# Patient Record
Sex: MALE | Race: BLACK OR AFRICAN AMERICAN | Employment: UNEMPLOYED | URBAN - METROPOLITAN AREA
[De-identification: names, ages, dates, MRNs, and addresses within clinical notes are randomized per-mention and may not be internally consistent; named-entity substitution may affect disease eponyms.]

---

## 2017-02-05 ENCOUNTER — APPOINTMENT (OUTPATIENT)
Dept: GENERAL RADIOLOGY | Age: 27
End: 2017-02-05
Payer: MEDICARE

## 2017-02-05 ENCOUNTER — APPOINTMENT (OUTPATIENT)
Dept: CT IMAGING | Age: 27
End: 2017-02-05
Payer: MEDICARE

## 2017-02-05 ENCOUNTER — HOSPITAL ENCOUNTER (EMERGENCY)
Age: 27
Discharge: HOME | End: 2017-02-05
Attending: EMERGENCY MEDICINE
Payer: MEDICARE

## 2017-02-05 VITALS
RESPIRATION RATE: 18 BRPM | DIASTOLIC BLOOD PRESSURE: 90 MMHG | OXYGEN SATURATION: 99 % | TEMPERATURE: 97.3 F | SYSTOLIC BLOOD PRESSURE: 153 MMHG | HEART RATE: 89 BPM
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
## 2017-02-05 DIAGNOSIS — M79.605 LEG PAIN, LEFT: ICD-10-CM

## 2017-02-05 DIAGNOSIS — S09.90XA CLOSED HEAD INJURY, INITIAL ENCOUNTER: ICD-10-CM

## 2017-02-05 PROCEDURE — 6370000000 HC RX 637 (ALT 250 FOR IP): Performed by: EMERGENCY MEDICINE

## 2017-02-05 PROCEDURE — 99284 EMERGENCY DEPT VISIT MOD MDM: CPT

## 2017-02-05 PROCEDURE — 73590 X-RAY EXAM OF LOWER LEG: CPT

## 2017-02-05 PROCEDURE — 72125 CT NECK SPINE W/O DYE: CPT

## 2017-02-05 PROCEDURE — 70450 CT HEAD/BRAIN W/O DYE: CPT

## 2017-02-05 RX ORDER — CYCLOBENZAPRINE HCL 10 MG
10 TABLET ORAL ONCE
Status: COMPLETED | OUTPATIENT
Start: 2017-02-05 | End: 2017-02-05

## 2017-02-05 RX ORDER — CYCLOBENZAPRINE HCL 10 MG
10 TABLET ORAL 3 TIMES DAILY PRN
Qty: 30 TABLET | Refills: 0 | Status: SHIPPED | OUTPATIENT
Start: 2017-02-05 | End: 2017-02-15

## 2017-02-05 RX ORDER — ACETAMINOPHEN 500 MG
1000 TABLET ORAL ONCE
Status: COMPLETED | OUTPATIENT
Start: 2017-02-05 | End: 2017-02-05

## 2017-02-05 RX ORDER — CYCLOBENZAPRINE HCL 10 MG
TABLET ORAL
Status: DISCONTINUED
Start: 2017-02-05 | End: 2017-02-05 | Stop reason: HOSPADM

## 2017-02-05 RX ORDER — SENNOSIDES 8.6 MG
650 CAPSULE ORAL EVERY 8 HOURS PRN
Qty: 60 TABLET | Refills: 0 | Status: SHIPPED | OUTPATIENT
Start: 2017-02-05

## 2017-02-05 RX ADMIN — CYCLOBENZAPRINE HYDROCHLORIDE 10 MG: 10 TABLET, FILM COATED ORAL at 00:29

## 2017-02-05 RX ADMIN — ACETAMINOPHEN 1000 MG: 500 TABLET ORAL at 00:29

## 2017-02-05 ASSESSMENT — PAIN SCALES - GENERAL: PAINLEVEL_OUTOF10: 9

## 2017-02-05 ASSESSMENT — ENCOUNTER SYMPTOMS
ABDOMINAL PAIN: 0
BLURRED VISION: 0
BACK PAIN: 1
SHORTNESS OF BREATH: 0

## 2017-02-05 ASSESSMENT — PAIN DESCRIPTION - PAIN TYPE: TYPE: ACUTE PAIN

## 2017-05-18 ENCOUNTER — HOSPITAL ENCOUNTER (EMERGENCY)
Age: 27
Discharge: HOME | End: 2017-05-18
Attending: EMERGENCY MEDICINE
Payer: MEDICARE

## 2017-05-18 VITALS
DIASTOLIC BLOOD PRESSURE: 72 MMHG | RESPIRATION RATE: 18 BRPM | OXYGEN SATURATION: 97 % | SYSTOLIC BLOOD PRESSURE: 112 MMHG | WEIGHT: 147 LBS | BODY MASS INDEX: 19.91 KG/M2 | HEIGHT: 72 IN | TEMPERATURE: 98 F | HEART RATE: 78 BPM

## 2017-05-18 DIAGNOSIS — A64 STD (MALE): Primary | ICD-10-CM

## 2017-05-18 LAB
-: ABNORMAL
AMORPHOUS: ABNORMAL
BACTERIA: ABNORMAL
BILIRUBIN URINE: NEGATIVE
CASTS UA: ABNORMAL /LPF (ref 0–8)
COLOR: YELLOW
CRYSTALS, UA: ABNORMAL /HPF
EPITHELIAL CELLS UA: ABNORMAL /HPF (ref 0–5)
GLUCOSE URINE: NEGATIVE
KETONES, URINE: NEGATIVE
LEUKOCYTE ESTERASE, URINE: ABNORMAL
MUCUS: ABNORMAL
NITRITE, URINE: NEGATIVE
OTHER OBSERVATIONS UA: ABNORMAL
PH UA: >9 (ref 5–8)
PROTEIN UA: NEGATIVE
RBC UA: ABNORMAL /HPF (ref 0–4)
RENAL EPITHELIAL, UA: ABNORMAL /HPF
SPECIFIC GRAVITY UA: 1.02 (ref 1–1.03)
TRICHOMONAS: ABNORMAL
TURBIDITY: CLEAR
URINE HGB: NEGATIVE
UROBILINOGEN, URINE: NORMAL
WBC UA: ABNORMAL /HPF (ref 0–5)
YEAST: ABNORMAL

## 2017-05-18 PROCEDURE — 99283 EMERGENCY DEPT VISIT LOW MDM: CPT

## 2017-05-18 PROCEDURE — 96372 THER/PROPH/DIAG INJ SC/IM: CPT

## 2017-05-18 PROCEDURE — 87591 N.GONORRHOEAE DNA AMP PROB: CPT

## 2017-05-18 PROCEDURE — 6360000002 HC RX W HCPCS: Performed by: EMERGENCY MEDICINE

## 2017-05-18 PROCEDURE — 87086 URINE CULTURE/COLONY COUNT: CPT

## 2017-05-18 PROCEDURE — 6370000000 HC RX 637 (ALT 250 FOR IP): Performed by: EMERGENCY MEDICINE

## 2017-05-18 PROCEDURE — 81001 URINALYSIS AUTO W/SCOPE: CPT

## 2017-05-18 PROCEDURE — 87491 CHLMYD TRACH DNA AMP PROBE: CPT

## 2017-05-18 RX ORDER — CEFTRIAXONE SODIUM 250 MG/1
250 INJECTION, POWDER, FOR SOLUTION INTRAMUSCULAR; INTRAVENOUS ONCE
Status: COMPLETED | OUTPATIENT
Start: 2017-05-18 | End: 2017-05-18

## 2017-05-18 RX ORDER — AZITHROMYCIN 250 MG/1
1000 TABLET, FILM COATED ORAL ONCE
Status: COMPLETED | OUTPATIENT
Start: 2017-05-18 | End: 2017-05-18

## 2017-05-18 RX ADMIN — AZITHROMYCIN 1000 MG: 250 TABLET, FILM COATED ORAL at 11:11

## 2017-05-18 RX ADMIN — CEFTRIAXONE SODIUM 250 MG: 250 INJECTION, POWDER, FOR SOLUTION INTRAMUSCULAR; INTRAVENOUS at 11:11

## 2017-05-18 ASSESSMENT — PAIN DESCRIPTION - PAIN TYPE: TYPE: ACUTE PAIN

## 2017-05-18 ASSESSMENT — PAIN DESCRIPTION - ORIENTATION: ORIENTATION: RIGHT;LOWER

## 2017-05-18 ASSESSMENT — ENCOUNTER SYMPTOMS
VOMITING: 0
ABDOMINAL PAIN: 0
BACK PAIN: 0
NAUSEA: 0

## 2017-05-18 ASSESSMENT — PAIN SCALES - GENERAL: PAINLEVEL_OUTOF10: 7

## 2017-05-18 ASSESSMENT — PAIN DESCRIPTION - DESCRIPTORS: DESCRIPTORS: ACHING;PRESSURE

## 2017-05-18 ASSESSMENT — PAIN DESCRIPTION - LOCATION: LOCATION: GROIN

## 2017-05-19 LAB
C. TRACHOMATIS DNA ,URINE: NEGATIVE
CULTURE: NO GROWTH
CULTURE: NORMAL
Lab: NORMAL
N. GONORRHOEAE DNA, URINE: NEGATIVE
SPECIMEN DESCRIPTION: NORMAL
STATUS: NORMAL

## 2017-08-12 ENCOUNTER — HOSPITAL ENCOUNTER (EMERGENCY)
Age: 27
Discharge: HOME | End: 2017-08-12
Attending: EMERGENCY MEDICINE
Payer: MEDICAID

## 2017-08-12 ENCOUNTER — APPOINTMENT (OUTPATIENT)
Dept: ULTRASOUND IMAGING | Age: 27
End: 2017-08-12
Payer: MEDICAID

## 2017-08-12 VITALS
WEIGHT: 147 LBS | SYSTOLIC BLOOD PRESSURE: 126 MMHG | OXYGEN SATURATION: 97 % | HEART RATE: 86 BPM | BODY MASS INDEX: 19.94 KG/M2 | DIASTOLIC BLOOD PRESSURE: 74 MMHG | RESPIRATION RATE: 16 BRPM | TEMPERATURE: 98.2 F

## 2017-08-12 DIAGNOSIS — N50.82 SCROTAL PAIN: Primary | ICD-10-CM

## 2017-08-12 LAB
-: NORMAL
AMORPHOUS: NORMAL
BACTERIA: NORMAL
BILIRUBIN URINE: NEGATIVE
CASTS UA: NORMAL /LPF (ref 0–8)
COLOR: YELLOW
COMMENT UA: ABNORMAL
CRYSTALS, UA: NORMAL /HPF
EPITHELIAL CELLS UA: NORMAL /HPF (ref 0–5)
GLUCOSE URINE: NEGATIVE
KETONES, URINE: NEGATIVE
LEUKOCYTE ESTERASE, URINE: ABNORMAL
MUCUS: NORMAL
NITRITE, URINE: NEGATIVE
OTHER OBSERVATIONS UA: NORMAL
PH UA: 6.5 (ref 5–8)
PROTEIN UA: NEGATIVE
RBC UA: NORMAL /HPF (ref 0–4)
RENAL EPITHELIAL, UA: NORMAL /HPF
SPECIFIC GRAVITY UA: 1.01 (ref 1–1.03)
TRICHOMONAS: NORMAL
TURBIDITY: CLEAR
URINE HGB: NEGATIVE
UROBILINOGEN, URINE: NORMAL
WBC UA: NORMAL /HPF (ref 0–5)
YEAST: NORMAL

## 2017-08-12 PROCEDURE — 96372 THER/PROPH/DIAG INJ SC/IM: CPT

## 2017-08-12 PROCEDURE — 81001 URINALYSIS AUTO W/SCOPE: CPT

## 2017-08-12 PROCEDURE — 76870 US EXAM SCROTUM: CPT

## 2017-08-12 PROCEDURE — 87591 N.GONORRHOEAE DNA AMP PROB: CPT

## 2017-08-12 PROCEDURE — 87086 URINE CULTURE/COLONY COUNT: CPT

## 2017-08-12 PROCEDURE — 6360000002 HC RX W HCPCS: Performed by: PHYSICIAN ASSISTANT

## 2017-08-12 PROCEDURE — G0383 LEV 4 HOSP TYPE B ED VISIT: HCPCS

## 2017-08-12 PROCEDURE — 87491 CHLMYD TRACH DNA AMP PROBE: CPT

## 2017-08-12 PROCEDURE — 6370000000 HC RX 637 (ALT 250 FOR IP): Performed by: PHYSICIAN ASSISTANT

## 2017-08-12 RX ORDER — CEFTRIAXONE SODIUM 250 MG/1
250 INJECTION, POWDER, FOR SOLUTION INTRAMUSCULAR; INTRAVENOUS ONCE
Status: COMPLETED | OUTPATIENT
Start: 2017-08-12 | End: 2017-08-12

## 2017-08-12 RX ORDER — DOXYCYCLINE HYCLATE 100 MG
100 TABLET ORAL 2 TIMES DAILY
Qty: 28 TABLET | Refills: 0 | Status: SHIPPED | OUTPATIENT
Start: 2017-08-12 | End: 2017-08-26

## 2017-08-12 RX ORDER — AZITHROMYCIN 250 MG/1
1000 TABLET, FILM COATED ORAL ONCE
Status: COMPLETED | OUTPATIENT
Start: 2017-08-12 | End: 2017-08-12

## 2017-08-12 RX ORDER — IBUPROFEN 800 MG/1
800 TABLET ORAL EVERY 8 HOURS PRN
Qty: 20 TABLET | Refills: 0 | Status: SHIPPED | OUTPATIENT
Start: 2017-08-12

## 2017-08-12 RX ADMIN — CEFTRIAXONE SODIUM 250 MG: 250 INJECTION, POWDER, FOR SOLUTION INTRAMUSCULAR; INTRAVENOUS at 19:18

## 2017-08-12 RX ADMIN — AZITHROMYCIN 1000 MG: 250 TABLET, FILM COATED ORAL at 19:17

## 2017-08-12 ASSESSMENT — ENCOUNTER SYMPTOMS
TROUBLE SWALLOWING: 0
NAUSEA: 0
VOMITING: 0
COUGH: 0
SHORTNESS OF BREATH: 0
WHEEZING: 0

## 2017-08-12 ASSESSMENT — PAIN DESCRIPTION - ONSET: ONSET: GRADUAL

## 2017-08-12 ASSESSMENT — PAIN SCALES - GENERAL: PAINLEVEL_OUTOF10: 3

## 2017-08-12 ASSESSMENT — PAIN DESCRIPTION - LOCATION: LOCATION: SCROTUM

## 2017-08-12 ASSESSMENT — PAIN DESCRIPTION - PROGRESSION: CLINICAL_PROGRESSION: GRADUALLY WORSENING

## 2017-08-12 ASSESSMENT — PAIN DESCRIPTION - DESCRIPTORS: DESCRIPTORS: THROBBING;SHARP

## 2017-08-12 ASSESSMENT — PAIN DESCRIPTION - PAIN TYPE: TYPE: ACUTE PAIN

## 2017-08-12 ASSESSMENT — PAIN DESCRIPTION - ORIENTATION: ORIENTATION: RIGHT

## 2017-08-12 ASSESSMENT — PAIN DESCRIPTION - FREQUENCY: FREQUENCY: CONTINUOUS

## 2017-08-13 LAB
CULTURE: NO GROWTH
CULTURE: NORMAL
Lab: NORMAL
SPECIMEN DESCRIPTION: NORMAL
STATUS: NORMAL

## 2017-08-15 LAB
C. TRACHOMATIS DNA ,URINE: NEGATIVE
N. GONORRHOEAE DNA, URINE: NEGATIVE

## 2017-08-17 ENCOUNTER — HOSPITAL ENCOUNTER (EMERGENCY)
Age: 27
Discharge: HOME | End: 2017-08-17
Attending: EMERGENCY MEDICINE
Payer: MEDICAID

## 2017-08-17 VITALS
OXYGEN SATURATION: 99 % | TEMPERATURE: 98.2 F | DIASTOLIC BLOOD PRESSURE: 76 MMHG | RESPIRATION RATE: 16 BRPM | SYSTOLIC BLOOD PRESSURE: 128 MMHG | HEART RATE: 76 BPM

## 2017-08-17 DIAGNOSIS — L02.91 ABSCESS: Primary | ICD-10-CM

## 2017-08-17 PROCEDURE — 99282 EMERGENCY DEPT VISIT SF MDM: CPT

## 2017-08-17 RX ORDER — DOXYCYCLINE HYCLATE 100 MG
100 TABLET ORAL 2 TIMES DAILY
Qty: 20 TABLET | Refills: 0 | Status: SHIPPED | OUTPATIENT
Start: 2017-08-17 | End: 2017-08-27

## 2017-08-17 ASSESSMENT — ENCOUNTER SYMPTOMS
BACK PAIN: 0
SORE THROAT: 0
EYE PAIN: 0
COUGH: 0
CHEST TIGHTNESS: 0
RHINORRHEA: 0
PHOTOPHOBIA: 0
ABDOMINAL PAIN: 0
COLOR CHANGE: 1

## 2019-03-20 ENCOUNTER — HOSPITAL ENCOUNTER (EMERGENCY)
Age: 29
Discharge: HOME | End: 2019-03-20
Attending: EMERGENCY MEDICINE

## 2019-03-20 VITALS
OXYGEN SATURATION: 96 % | SYSTOLIC BLOOD PRESSURE: 143 MMHG | RESPIRATION RATE: 16 BRPM | DIASTOLIC BLOOD PRESSURE: 93 MMHG | WEIGHT: 147 LBS | TEMPERATURE: 98.4 F | HEIGHT: 72 IN | BODY MASS INDEX: 19.91 KG/M2 | HEART RATE: 77 BPM

## 2019-03-20 DIAGNOSIS — R21 RASH AND OTHER NONSPECIFIC SKIN ERUPTION: Primary | ICD-10-CM

## 2019-03-20 LAB
C. TRACHOMATIS DNA ,URINE: NEGATIVE
N. GONORRHOEAE DNA, URINE: NEGATIVE
SPECIMEN DESCRIPTION: NORMAL

## 2019-03-20 PROCEDURE — 87591 N.GONORRHOEAE DNA AMP PROB: CPT

## 2019-03-20 PROCEDURE — 6360000002 HC RX W HCPCS: Performed by: PHYSICIAN ASSISTANT

## 2019-03-20 PROCEDURE — 96372 THER/PROPH/DIAG INJ SC/IM: CPT

## 2019-03-20 PROCEDURE — 87491 CHLMYD TRACH DNA AMP PROBE: CPT

## 2019-03-20 PROCEDURE — 99282 EMERGENCY DEPT VISIT SF MDM: CPT

## 2019-03-20 PROCEDURE — 6370000000 HC RX 637 (ALT 250 FOR IP): Performed by: PHYSICIAN ASSISTANT

## 2019-03-20 RX ORDER — HYDROXYZINE 50 MG/1
50 TABLET, FILM COATED ORAL 3 TIMES DAILY PRN
Qty: 15 TABLET | Refills: 0 | Status: SHIPPED | OUTPATIENT
Start: 2019-03-20

## 2019-03-20 RX ORDER — HYDROXYZINE HYDROCHLORIDE 25 MG/1
50 TABLET, FILM COATED ORAL ONCE
Status: COMPLETED | OUTPATIENT
Start: 2019-03-20 | End: 2019-03-20

## 2019-03-20 RX ORDER — AZITHROMYCIN 250 MG/1
1000 TABLET, FILM COATED ORAL ONCE
Status: COMPLETED | OUTPATIENT
Start: 2019-03-20 | End: 2019-03-20

## 2019-03-20 RX ORDER — CEFTRIAXONE SODIUM 250 MG/1
250 INJECTION, POWDER, FOR SOLUTION INTRAMUSCULAR; INTRAVENOUS ONCE
Status: COMPLETED | OUTPATIENT
Start: 2019-03-20 | End: 2019-03-20

## 2019-03-20 RX ADMIN — CEFTRIAXONE SODIUM 250 MG: 250 INJECTION, POWDER, FOR SOLUTION INTRAMUSCULAR; INTRAVENOUS at 08:43

## 2019-03-20 RX ADMIN — HYDROXYZINE HYDROCHLORIDE 50 MG: 25 TABLET ORAL at 08:34

## 2019-03-20 RX ADMIN — AZITHROMYCIN 1000 MG: 250 TABLET, FILM COATED ORAL at 08:43

## 2019-03-20 ASSESSMENT — ENCOUNTER SYMPTOMS
NAUSEA: 0
VOMITING: 0
DIARRHEA: 0
COLOR CHANGE: 0
COUGH: 0
SHORTNESS OF BREATH: 0
BACK PAIN: 0
ABDOMINAL PAIN: 0
SORE THROAT: 0

## 2024-04-19 ENCOUNTER — HOSPITAL ENCOUNTER (EMERGENCY)
Age: 34
Discharge: HOME | End: 2024-04-19
Attending: EMERGENCY MEDICINE

## 2024-04-19 VITALS
OXYGEN SATURATION: 98 % | HEART RATE: 65 BPM | BODY MASS INDEX: 19.64 KG/M2 | WEIGHT: 144.84 LBS | TEMPERATURE: 98.3 F | SYSTOLIC BLOOD PRESSURE: 124 MMHG | DIASTOLIC BLOOD PRESSURE: 102 MMHG | RESPIRATION RATE: 16 BRPM

## 2024-04-19 DIAGNOSIS — R51.9 NONINTRACTABLE HEADACHE, UNSPECIFIED CHRONICITY PATTERN, UNSPECIFIED HEADACHE TYPE: Primary | ICD-10-CM

## 2024-04-19 PROCEDURE — 96372 THER/PROPH/DIAG INJ SC/IM: CPT

## 2024-04-19 PROCEDURE — 99284 EMERGENCY DEPT VISIT MOD MDM: CPT

## 2024-04-19 PROCEDURE — 6360000002 HC RX W HCPCS: Performed by: STUDENT IN AN ORGANIZED HEALTH CARE EDUCATION/TRAINING PROGRAM

## 2024-04-19 PROCEDURE — 6370000000 HC RX 637 (ALT 250 FOR IP): Performed by: STUDENT IN AN ORGANIZED HEALTH CARE EDUCATION/TRAINING PROGRAM

## 2024-04-19 RX ORDER — ACETAMINOPHEN 500 MG
1000 TABLET ORAL ONCE
Status: COMPLETED | OUTPATIENT
Start: 2024-04-19 | End: 2024-04-19

## 2024-04-19 RX ORDER — DIPHENHYDRAMINE HCL 25 MG
25 TABLET ORAL ONCE
Status: COMPLETED | OUTPATIENT
Start: 2024-04-19 | End: 2024-04-19

## 2024-04-19 RX ORDER — IBUPROFEN 800 MG/1
800 TABLET ORAL ONCE
Status: COMPLETED | OUTPATIENT
Start: 2024-04-19 | End: 2024-04-19

## 2024-04-19 RX ORDER — PROCHLORPERAZINE EDISYLATE 5 MG/ML
10 INJECTION INTRAMUSCULAR; INTRAVENOUS ONCE
Status: COMPLETED | OUTPATIENT
Start: 2024-04-19 | End: 2024-04-19

## 2024-04-19 RX ADMIN — DIPHENHYDRAMINE HYDROCHLORIDE 25 MG: 25 TABLET ORAL at 08:45

## 2024-04-19 RX ADMIN — IBUPROFEN 800 MG: 800 TABLET, FILM COATED ORAL at 08:45

## 2024-04-19 RX ADMIN — ACETAMINOPHEN 1000 MG: 500 TABLET ORAL at 08:45

## 2024-04-19 RX ADMIN — PROCHLORPERAZINE EDISYLATE 10 MG: 5 INJECTION INTRAMUSCULAR; INTRAVENOUS at 08:46

## 2024-04-19 ASSESSMENT — PAIN DESCRIPTION - LOCATION
LOCATION: HEAD
LOCATION: HEAD

## 2024-04-19 ASSESSMENT — ENCOUNTER SYMPTOMS
ABDOMINAL PAIN: 0
NAUSEA: 0
SHORTNESS OF BREATH: 0

## 2024-04-19 ASSESSMENT — PAIN SCALES - GENERAL
PAINLEVEL_OUTOF10: 8
PAINLEVEL_OUTOF10: 6

## 2024-04-19 ASSESSMENT — PAIN - FUNCTIONAL ASSESSMENT: PAIN_FUNCTIONAL_ASSESSMENT: 0-10

## 2024-04-19 NOTE — ED PROVIDER NOTES
[facility] ED  Emergency Department Encounter  Emergency Medicine Resident     Pt Name:Darryn Royal Jr.  MRN: [id number]  Birthdate 1990  Date of evaluation: 24  PCP:  Karen Mulligan MD  Note Started: 8:40 AM EDT      CHIEF COMPLAINT       Chief Complaint   Patient presents with    Migraine       HISTORY OF PRESENT ILLNESS  (Location/Symptom, Timing/Onset, Context/Setting, Quality, Duration, Modifying Factors, Severity.)      Darryn Royal Jr. is a 33 y.o. male who presents with chronic migraines.  Patient has been having migraines for the past 6 years.  Followed by neurology.  States that his headache is actually getting better, he had some coffee earlier today, he says that this helped.  Patient states that he was diagnosed with cluster headaches.  Patient's not complaining of any weakness chills no chest pain shortness of breath no dizziness denies gait abnormalities no blurry vision.  No other constitutional symptoms.  Patient denies any other medical problems.  Patient is requesting a work note today    PAST MEDICAL / SURGICAL / SOCIAL / FAMILY HISTORY      has a past medical history of Hydrocele.       has a past surgical history that includes Anterior cruciate ligament repair (Left, ) and Hydrocele surgery (Right, 2016).      Social History     Socioeconomic History    Marital status: Single     Spouse name: Not on file    Number of children: Not on file    Years of education: Not on file    Highest education level: Not on file   Occupational History    Not on file   Tobacco Use    Smoking status: Former     Current packs/day: 0.00     Types: Cigars, Cigarettes     Quit date: 2016     Years since quittin.8    Smokeless tobacco: Not on file   Substance and Sexual Activity    Alcohol use: Yes     Comment: 1 DRINK EVERY OTHER MONTH    Drug use: No    Sexual activity: Yes     Partners: Female   Other Topics Concern    Not on file   Social History Narrative    Not on  voice recognition program.  Efforts were made to edit the dictations but occasionally words are mis-transcribed.)

## 2024-04-19 NOTE — ED PROVIDER NOTES
[facility] ED     Emergency Department     Faculty Attestation        I performed a history and physical examination of the patient and discussed management with the resident. I reviewed the resident’s note and agree with the documented findings and plan of care. Any areas of disagreement are noted on the chart. I was personally present for the key portions of any procedures. I have documented in the chart those procedures where I was not present during the key portions. I have reviewed the emergency nurses triage note. I agree with the chief complaint, past medical history, past surgical history, allergies, medications, social and family history as documented unless otherwise noted below.  For Physician Assistant/ Nurse Practitioner cases/documentation I have personally evaluated this patient and have completed at least one if not all key elements of the E/M (history, physical exam, and MDM). Additional findings are as noted.      Vital Signs: BP: (!) 124/102  Pulse: 65  Respirations: 16  Temp: 98.3 °F (36.8 °C) SpO2: 98 %  PCP:  Karen Mulligan MD  Note Started: 4/19/24, 9:02 AM EDT    Pertinent Comments:       Patient has chronic HA's and this one is a typical HA for the patient.    Gradual in onset and not \"the worst HA of life\".    Denies fevers/chills/IVDA.    Physical exam:  CTA Bi, RRR, abd soft/nt/nd/+ BS.   Neuro: CN II-XII intact, 5/5 strength bilaterally, sensation to light touch intact bilaterally, Down going babinski's bilaterally, DTR's are 2+ bilaterally and equal.  Absolutely no meningismus and is nontoxic in appearance.  No tenderness to palpation of the temporal arteries, with strong temporal artery pulses palpated.  PERRL bilaterally.    Plan:  Pain control and reeval after.        Critical Care  None      (Please note that portions of this note were completed with a voice recognition program. Efforts were made to edit the dictations but

## 2024-04-19 NOTE — ED NOTES
Patient arrived to ED ambulatory to room. Patient has chief complaint of migraines over the past six years. Patient states that they come and go everyday and usually sees a neurologist. Pt states that he recently changed insurance and has not been able to go to appointments. Patient is currently getting over his migraine and his pain is in the left side of his head and his left eye. Patient states that his pain is relieved by some Tylenol and Motrin.

## 2024-04-19 NOTE — DISCHARGE INSTRUCTIONS
You have been seen in the ER today for headache.   If you begin to experience any symptoms such as chest pain shortness of breath nausea vomiting dizziness drowsiness abdominal pain loss of consciousness or any other symptoms you find concerning please return to the ED for follow-up evaluation.  If you have been given pain medication please take them only as prescribed. Do not take more medication than prescribed at any given time.  Please follow-up with your primary care provider within 3-5 days for continued care, sooner if you have concerns.

## 2024-05-03 ENCOUNTER — HOSPITAL ENCOUNTER (EMERGENCY)
Age: 34
Discharge: HOME | End: 2024-05-03
Attending: EMERGENCY MEDICINE

## 2024-05-03 VITALS
DIASTOLIC BLOOD PRESSURE: 94 MMHG | HEART RATE: 87 BPM | TEMPERATURE: 98.3 F | WEIGHT: 144 LBS | HEIGHT: 72 IN | OXYGEN SATURATION: 100 % | RESPIRATION RATE: 18 BRPM | BODY MASS INDEX: 19.5 KG/M2 | SYSTOLIC BLOOD PRESSURE: 123 MMHG

## 2024-05-03 DIAGNOSIS — G43.809 OTHER MIGRAINE WITHOUT STATUS MIGRAINOSUS, NOT INTRACTABLE: Primary | ICD-10-CM

## 2024-05-03 PROCEDURE — 6370000000 HC RX 637 (ALT 250 FOR IP): Performed by: EMERGENCY MEDICINE

## 2024-05-03 PROCEDURE — 99284 EMERGENCY DEPT VISIT MOD MDM: CPT

## 2024-05-03 PROCEDURE — 96372 THER/PROPH/DIAG INJ SC/IM: CPT

## 2024-05-03 PROCEDURE — 6360000002 HC RX W HCPCS: Performed by: EMERGENCY MEDICINE

## 2024-05-03 RX ORDER — SUMATRIPTAN 20 MG/1
1 SPRAY NASAL DAILY PRN
Qty: 1 EACH | Refills: 1 | Status: SHIPPED | OUTPATIENT
Start: 2024-05-03

## 2024-05-03 RX ORDER — SUMATRIPTAN 6 MG/.5ML
6 INJECTION, SOLUTION SUBCUTANEOUS ONCE
Status: COMPLETED | OUTPATIENT
Start: 2024-05-03 | End: 2024-05-03

## 2024-05-03 RX ORDER — KETOROLAC TROMETHAMINE 30 MG/ML
30 INJECTION, SOLUTION INTRAMUSCULAR; INTRAVENOUS ONCE
Status: COMPLETED | OUTPATIENT
Start: 2024-05-03 | End: 2024-05-03

## 2024-05-03 RX ADMIN — KETOROLAC TROMETHAMINE 30 MG: 30 INJECTION, SOLUTION INTRAMUSCULAR at 06:50

## 2024-05-03 RX ADMIN — SUMATRIPTAN 6 MG: 6 INJECTION, SOLUTION SUBCUTANEOUS at 06:50

## 2024-05-03 ASSESSMENT — PAIN SCALES - GENERAL
PAINLEVEL_OUTOF10: 10
PAINLEVEL_OUTOF10: 10

## 2024-05-03 NOTE — ED PROVIDER NOTES
EMERGENCY DEPARTMENT ENCOUNTER    Pt Name: Darryn Royal Jr.  MRN: [id number]  Birthdate 1990  Date of evaluation: 5/3/24  CHIEF COMPLAINT       Chief Complaint   Patient presents with    Migraine     Started about 0230.      HISTORY OF PRESENT ILLNESS   33-year-old male presents emergency room for headaches.  Patient gets headaches very frequently.  They are occurring at this point daily.  He will get several a day.  They usually last an hour or so.  They are always on the left side.  They cause tearing and redness to his left eye.  Symptoms seem to be progressing and more problematic for the patient.  He has had issues with insurance currently does not have a primary care doctor is not following up with neurology.  Patient in the past was given medication for migraines which seem to help temporarily.             REVIEW OF SYSTEMS     Review of Systems   Neurological:  Positive for headaches.     PASTMEDICAL HISTORY     Past Medical History:   Diagnosis Date    Hydrocele      Past Problem List  There is no problem list on file for this patient.    SURGICAL HISTORY       Past Surgical History:   Procedure Laterality Date    ANTERIOR CRUCIATE LIGAMENT REPAIR Left 2008    HYDROCELE EXCISION Right 09/28/2016     CURRENT MEDICATIONS       Previous Medications    ACETAMINOPHEN (TYLENOL 8 HOUR) 650 MG EXTENDED RELEASE TABLET    Take 1 tablet by mouth every 8 hours as needed for Pain    HYDROXYZINE (ATARAX) 50 MG TABLET    Take 1 tablet by mouth 3 times daily as needed for Itching    IBUPROFEN (ADVIL;MOTRIN) 800 MG TABLET    Take 1 tablet by mouth every 8 hours as needed for Pain    LORATADINE 10 MG CAPS    Take 10 mg by mouth as needed      ALLERGIES     is allergic to tape [adhesive tape] and vicodin [hydrocodone-acetaminophen].  FAMILY HISTORY     He indicated that his mother is alive. He indicated that his father is alive. He indicated that both of his sisters are alive. He indicated that all of his six brothers  Disposition    Alert oriented nondistressed 33-year-old male presenting to the emergency room for left-sided headache.  Oxygen sumatriptan and Toradol were given to the patient.  Disposition is pending reevaluation.  Case signed out to Dr. Bartholomew.      \"ED Course\" Notes From Epic Narrator:         CRITICAL CARE:       PROCEDURES:    Procedures      DATA FOR LAB AND RADIOLOGY TESTS ORDERED BELOW ARE REVIEWED BY THE ED CLINICIAN:    RADIOLOGY: All x-rays, CT, MRI, and formal ultrasound images (except ED bedside ultrasound) are read by the radiologist, see reports below, unless otherwise noted in MDM or here.  Reports below are reviewed by myself.  No orders to display       LABS: Lab orders shown below, the results are reviewed by myself, and all abnormals are listed below.  Labs Reviewed - No data to display    Vitals Reviewed:    Vitals:    05/03/24 0608   BP: (!) 123/94   Pulse: 87   Resp: 18   Temp: 98.3 °F (36.8 °C)   TempSrc: Oral   SpO2: 100%   Weight: 65.3 kg (144 lb)   Height: 1.829 m (6')     MEDICATIONS GIVEN TO PATIENT THIS ENCOUNTER:  Orders Placed This Encounter   Medications    SUMAtriptan (IMITREX) injection 6 mg    ketorolac (TORADOL) injection 30 mg     DISCHARGE PRESCRIPTIONS:  New Prescriptions    No medications on file     PHYSICIAN CONSULTS ORDERED THIS ENCOUNTER:  None  FINAL IMPRESSION    No diagnosis found.      DISPOSITION/PLAN   DISPOSITION        OUTPATIENT FOLLOW UP THE PATIENT:  No follow-up provider specified.    Erica B Goldberger, MD Goldberger, Erica B, MD  05/08/24 0199

## 2024-05-03 NOTE — DISCHARGE INSTRUCTIONS
Keep yourself well-hydrated, continue to use Tylenol and ibuprofen as needed, use sumatriptan nasal spray as needed for severe migraines.  If you have severe worsening of your headaches or any new concerning symptoms come back to the ER.  Follow-up with a primary care provider and neurologist.

## 2024-05-03 NOTE — ED PROVIDER NOTES
8:00am  33-year-old male with a history of migraines presents to the ED for headache for the past several days consistent with previous migraines.  Patient states that he gets migraines very frequently, has not been able to see a neurologist or primary care provider for some time due to insurance issues.  He states that today the migraine is lasting longer than usual.  On presentation patient is well-appearing with stable vital signs, he has a nonfocal neurologic exam, low concern for acute intracranial process requiring imaging.  After medications including sumatriptan patient reports significant improvement.  Will discharge patient with a prescription for nasal sumatriptan, he was given return precautions, phone number for outpatient PCP and neurology follow-up, he was discharged in stable condition.      ICD-10-CM    1. Other migraine without status migrainosus, not intractable  G43.809             Monica Bartholomew MD  Emergency Medicine     Monica Bartholomew MD  05/03/24 1619

## 2024-05-03 NOTE — ED NOTES
Pt presents to ED from home c/o migraine headache. Pt gets migraines/headaches daily, reports for the last month, the pain has been different, in specific places on his head or eyes that is not typical for him. Pt reports a migraine every day that he has woken up with. Reports recently has been getting nose bleeds, states he feels sometimes he can't control his body temp, or his heart is racing or slowing down during these migraines. Pt takes ES tylenol or motrin at home. Reports a Hx of brain tumors in the family. Reports he sees neurology but has missed the last couple appointments d/t insurance issues. Reports blurry vision and pain near the L eye today.  Pt placed on NC O2 by Dr Goldberger, SpO2 WNL on RA

## 2024-09-18 ENCOUNTER — HOSPITAL ENCOUNTER (EMERGENCY)
Age: 34
Discharge: HOME | End: 2024-09-18
Attending: STUDENT IN AN ORGANIZED HEALTH CARE EDUCATION/TRAINING PROGRAM

## 2024-09-18 VITALS
TEMPERATURE: 97.8 F | OXYGEN SATURATION: 100 % | DIASTOLIC BLOOD PRESSURE: 106 MMHG | HEART RATE: 64 BPM | RESPIRATION RATE: 16 BRPM | SYSTOLIC BLOOD PRESSURE: 138 MMHG

## 2024-09-18 DIAGNOSIS — G44.019 EPISODIC CLUSTER HEADACHE, NOT INTRACTABLE: Primary | ICD-10-CM

## 2024-09-18 PROCEDURE — 99282 EMERGENCY DEPT VISIT SF MDM: CPT

## 2024-09-23 ENCOUNTER — HOSPITAL ENCOUNTER (EMERGENCY)
Age: 34
Discharge: HOME | End: 2024-09-23
Attending: EMERGENCY MEDICINE

## 2024-09-23 VITALS
WEIGHT: 145 LBS | SYSTOLIC BLOOD PRESSURE: 132 MMHG | DIASTOLIC BLOOD PRESSURE: 96 MMHG | OXYGEN SATURATION: 97 % | RESPIRATION RATE: 18 BRPM | TEMPERATURE: 97.5 F | BODY MASS INDEX: 19.67 KG/M2 | HEART RATE: 95 BPM

## 2024-09-23 DIAGNOSIS — G44.029 CHRONIC CLUSTER HEADACHE, NOT INTRACTABLE: Primary | ICD-10-CM

## 2024-09-23 PROCEDURE — 6360000002 HC RX W HCPCS

## 2024-09-23 PROCEDURE — 99284 EMERGENCY DEPT VISIT MOD MDM: CPT

## 2024-09-23 PROCEDURE — 96372 THER/PROPH/DIAG INJ SC/IM: CPT

## 2024-09-23 RX ORDER — KETOROLAC TROMETHAMINE 30 MG/ML
30 INJECTION, SOLUTION INTRAMUSCULAR; INTRAVENOUS ONCE
Status: COMPLETED | OUTPATIENT
Start: 2024-09-23 | End: 2024-09-23

## 2024-09-23 RX ADMIN — KETOROLAC TROMETHAMINE 30 MG: 30 INJECTION, SOLUTION INTRAMUSCULAR; INTRAVENOUS at 07:51

## 2024-09-23 ASSESSMENT — PAIN SCALES - GENERAL: PAINLEVEL_OUTOF10: 4

## 2024-09-24 ASSESSMENT — ENCOUNTER SYMPTOMS
PHOTOPHOBIA: 0
SHORTNESS OF BREATH: 0
NAUSEA: 0
VOMITING: 0
WHEEZING: 0

## 2024-09-27 ENCOUNTER — HOSPITAL ENCOUNTER (EMERGENCY)
Age: 34
Discharge: HOME | End: 2024-09-27
Attending: EMERGENCY MEDICINE

## 2024-09-27 VITALS
DIASTOLIC BLOOD PRESSURE: 94 MMHG | WEIGHT: 150.35 LBS | RESPIRATION RATE: 18 BRPM | BODY MASS INDEX: 20.39 KG/M2 | OXYGEN SATURATION: 98 % | SYSTOLIC BLOOD PRESSURE: 151 MMHG | TEMPERATURE: 97.9 F | HEART RATE: 62 BPM

## 2024-09-27 DIAGNOSIS — G44.019 EPISODIC CLUSTER HEADACHE, NOT INTRACTABLE: Primary | ICD-10-CM

## 2024-09-27 PROCEDURE — 6370000000 HC RX 637 (ALT 250 FOR IP)

## 2024-09-27 PROCEDURE — 99283 EMERGENCY DEPT VISIT LOW MDM: CPT

## 2024-09-27 RX ORDER — PREDNISONE 50 MG/1
50 TABLET ORAL DAILY
Qty: 4 TABLET | Refills: 0 | Status: SHIPPED | OUTPATIENT
Start: 2024-09-27 | End: 2024-10-01

## 2024-09-27 RX ORDER — PREDNISONE 20 MG/1
50 TABLET ORAL ONCE
Status: COMPLETED | OUTPATIENT
Start: 2024-09-27 | End: 2024-09-27

## 2024-09-27 RX ORDER — FLUTICASONE PROPIONATE 50 MCG
2 SPRAY, SUSPENSION (ML) NASAL DAILY
Qty: 16 G | Refills: 0 | Status: SHIPPED | OUTPATIENT
Start: 2024-09-27

## 2024-09-27 RX ORDER — SUMATRIPTAN 25 MG/1
25 TABLET, FILM COATED ORAL
Qty: 9 TABLET | Refills: 0 | Status: SHIPPED | OUTPATIENT
Start: 2024-09-27 | End: 2024-09-27

## 2024-09-27 RX ORDER — IBUPROFEN 400 MG/1
400 TABLET, FILM COATED ORAL EVERY 6 HOURS PRN
Qty: 21 TABLET | Refills: 0 | Status: SHIPPED | OUTPATIENT
Start: 2024-09-27

## 2024-09-27 RX ORDER — ACETAMINOPHEN 500 MG
500 TABLET ORAL 4 TIMES DAILY PRN
Qty: 21 TABLET | Refills: 0 | Status: SHIPPED | OUTPATIENT
Start: 2024-09-27

## 2024-09-27 RX ADMIN — PREDNISONE 50 MG: 20 TABLET ORAL at 11:32

## 2024-09-27 ASSESSMENT — PAIN DESCRIPTION - DESCRIPTORS: DESCRIPTORS: THROBBING

## 2024-09-27 ASSESSMENT — PAIN SCALES - GENERAL: PAINLEVEL_OUTOF10: 7

## 2024-09-27 ASSESSMENT — PAIN DESCRIPTION - LOCATION: LOCATION: HEAD

## 2024-09-27 ASSESSMENT — PAIN - FUNCTIONAL ASSESSMENT: PAIN_FUNCTIONAL_ASSESSMENT: 0-10

## 2024-09-27 ASSESSMENT — LIFESTYLE VARIABLES
HOW OFTEN DO YOU HAVE A DRINK CONTAINING ALCOHOL: NEVER
HOW MANY STANDARD DRINKS CONTAINING ALCOHOL DO YOU HAVE ON A TYPICAL DAY: PATIENT DOES NOT DRINK

## 2024-09-27 NOTE — ED PROVIDER NOTES
[facility] ED  Emergency Department Encounter  Emergency Medicine Resident     Pt Name: Darryn Royal Jr.  MRN: [id number]  Birthdate 1990  Date of evaluation: 24    CHIEF COMPLAINT:   Chief Complaint   Patient presents with    Headache       HISTORY OF PRESENT ILLNESS     Darryn Royal Jr. is a 34 y.o. male who presents with an acute headache.  Patient reports a history of similar headaches.  Today's headache is Same as compared to the patient's typical headache. The patient describes the headache as intermittent throbbing pain, sharp pain, squeezing pain. Denies Weakness or numbness/tingling.  No loss of Bowel/Bladder function.  Denies CP/SOB/n/v/f/c/abd pain.      REVIEW OF SYSTEMS       See HPI    PAST MEDICAL/SURGICAL/FAMILY HISTORY     Past Medical Hx:      has a past medical history of Hydrocele.    Past Surgical Hx:     has a past surgical history that includes Anterior cruciate ligament repair (Left, ) and Hydrocele surgery (Right, 2016).    Social Hx:  Social History     Socioeconomic History    Marital status: Single     Spouse name: Not on file    Number of children: Not on file    Years of education: Not on file    Highest education level: Not on file   Occupational History    Not on file   Tobacco Use    Smoking status: Former     Current packs/day: 0.00     Types: Cigars, Cigarettes     Quit date: 2016     Years since quittin.2    Smokeless tobacco: Not on file   Substance and Sexual Activity    Alcohol use: Yes     Comment: 1 DRINK EVERY OTHER MONTH    Drug use: No    Sexual activity: Yes     Partners: Female   Other Topics Concern    Not on file   Social History Narrative    Not on file     Social Determinants of Health     Financial Resource Strain: Not on file   Food Insecurity: No Food Insecurity (2023)    Received from [employer]    Hunger Screening     Within the past 12 months we worried whether our food

## 2024-09-27 NOTE — ED NOTES
Pt to ED with c/o migraine. Pt reports pain behind his L eye stating he has \"cluster headaches.\"

## 2024-09-27 NOTE — DISCHARGE INSTRUCTIONS
You will take 50 mg of prednisone for the next 4 days.  You received a dose here in the emergency department.    The imitrex as a headache abortive medication.  You can take as the headache starts.  Do not take more than once a day.  Please follow-up with neurology office.      Smoking cigarettes or being around anyone that smokes cigarettes can cause constriction of the blood vessels in the brain which in turn can cause you to have further headaches.    For pain use acetaminophen (Tylenol) or ibuprofen (Motrin / Advil), unless prescribed medications that have acetaminophen or ibuprofen (or similar medications) in it.  You can take over the counter acetaminophen tablets (1 - 2 tablets of the 500-mg strength every 6 hours) or ibuprofen tablets (2 tablets every 4 hours).   Avoid taking narcotics for headaches (can cause a worse headache in several hours after taking the medication).  Make sure that you drink plenty of water or Gatorade (or similar solution) to keep yourself hydrated.    PLEASE RETURN TO THE EMERGENCY DEPARTMENT IMMEDIATELY for worsening symptoms, change in vision / hearing / taste, ringing in your ears, loss of sensation or difficulty moving your arms or legs, or if you develop any concerning symptoms such as: high fever not relieved by acetaminophen (Tylenol) and/or ibuprofen (Motrin / Advil), chills, shortness of breath, chest pain, feeling of your heart fluttering or racing, persistent nausea and/or vomiting, vomiting up blood, blood in your stool, numbness, loss of consciousness, weakness or tingling in the arms or legs or change in color of the extremities, changes in mental status, persistent headache, blurry vision, loss of bladder / bowel control, unable to follow up with your physician, or other any other care or concern

## 2024-09-27 NOTE — DISCHARGE INSTR - COC
Continuity of Care Form    Patient Name: Darryn Royal Jr.   :  1990  MRN:  [id number]    Admit date:  2024  Discharge date:  ***    Code Status Order: No Order   Advance Directives:   Advance Care Flowsheet Documentation             Admitting Physician:  No admitting provider for patient encounter.  PCP: No, Pcp    Discharging Nurse: ***  Discharging Hospital Unit/Room#:   Discharging Unit Phone Number: ***    Emergency Contact:   Extended Emergency Contact Information  Primary Emergency Contact: Reji Rendon  Address: [address] [place] [address]  Home Phone: [phone]  Relation: Parent    Past Surgical History:  Past Surgical History:   Procedure Laterality Date    ANTERIOR CRUCIATE LIGAMENT REPAIR Left     HYDROCELE EXCISION Right 2016       Immunization History:   Immunization History   Administered Date(s) Administered    TDaP, ADACEL (age 10y-64y), BOOSTRIX (age 10y+), IM, 0.5mL 2016       Active Problems:  There is no problem list on file for this patient.      Isolation/Infection:   Isolation            No Isolation          Patient Infection Status       None to display            Nurse Assessment:  Last Vital Signs: BP (!) 151/94   Pulse 62   Temp 97.9 °F (36.6 °C) (Oral)   Resp 18   Wt 68.2 kg (150 lb 5.7 oz)   SpO2 98%   BMI 20.39 kg/m²     Last documented pain score (0-10 scale): Pain Level: 7  Last Weight:   Wt Readings from Last 1 Encounters:   24 68.2 kg (150 lb 5.7 oz)     Mental Status:  {IP PT MENTAL STATUS:47401}    IV Access:  { JESSA IV ACCESS:178646026}    Nursing Mobility/ADLs:  Walking   {CHP DME ADLs:359250324}  Transfer  {CHP DME ADLs:472460892}  Bathing  {CHP DME ADLs:203346061}  Dressing  {CHP DME ADLs:030251613}  Toileting  {CHP DME ADLs:996249099}  Feeding  {CHP DME ADLs:212947407}  Med Admin  {CHP DME ADLs:363033610}  Med Delivery   { JESSA MED Delivery:912797512}    Wound Care Documentation and Therapy:       Labs or Other Treatments After Discharge: ***    Physician Certification: I certify the above information and transfer of Darryn Royal Jr.  is necessary for the continuing treatment of the diagnosis listed and that he requires {Admit to Appropriate Level of Care:10944} for {GREATER/LESS:282522473} 30 days.     Update Admission H&P: {CHP DME Changes in HandP:391259663}    PHYSICIAN SIGNATURE:  {Esignature:313739896}

## 2024-09-27 NOTE — ED PROVIDER NOTES
[facility]  Emergency Department  Faculty Attestation     I performed a history and physical examination of the patient and discussed management with the resident. I reviewed the resident’s note and agree with the documented findings and plan of care. Any areas of disagreement are noted on the chart. I was personally present for the key portions of any procedures. I have documented in the chart those procedures where I was not present during the key portions. I have reviewed the emergency nurses triage note. I agree with the chief complaint, past medical history, past surgical history, allergies, medications, social and family history as documented unless otherwise noted below.    For Physician Assistant/ Nurse Practitioner cases/documentation I have personally evaluated this patient and have completed at least one if not all key elements of the E/M (history, physical exam, and MDM). Additional findings are as noted.    Preliminary note started at 11:10 AM EDT    Primary Care Physician:  Elizabeth, Pcp    Screenings:  [unfilled]    CHIEF COMPLAINT       Chief Complaint   Patient presents with    Headache       RECENT VITALS:   BP (!) 151/94   Pulse 62   Temp 97.9 °F (36.6 °C) (Oral)   Resp 18   Wt 68.2 kg (150 lb 5.7 oz)   SpO2 98%   BMI 20.39 kg/m²     LABS:  Labs Reviewed - No data to display    Radiology  No orders to display           Attending Physician Additional  Notes    Patient has intermittent episodes of left sided cluster type headache typically lasting 30 minutes to longer, partially relieved with ibuprofen.  He was previously prescribed Imitrex but unable to get it filled because of the cost.  He is not presently on steroids indomethacin or antiepileptic agents for prophylaxis.  His PCP gave him samples of Ubrelvy with partial relief.  No alcohol use.  He occasionally uses caffeine for improvement.  They are nearly always left-sided with congestion and tearing

## 2024-10-07 ENCOUNTER — HOSPITAL ENCOUNTER (EMERGENCY)
Age: 34
Discharge: HOME | End: 2024-10-07
Attending: EMERGENCY MEDICINE

## 2024-10-07 VITALS
DIASTOLIC BLOOD PRESSURE: 92 MMHG | OXYGEN SATURATION: 98 % | RESPIRATION RATE: 16 BRPM | HEART RATE: 77 BPM | SYSTOLIC BLOOD PRESSURE: 121 MMHG | TEMPERATURE: 98.4 F

## 2024-10-07 DIAGNOSIS — G44.019 EPISODIC CLUSTER HEADACHE, NOT INTRACTABLE: Primary | ICD-10-CM

## 2024-10-07 PROCEDURE — 99283 EMERGENCY DEPT VISIT LOW MDM: CPT

## 2024-10-07 ASSESSMENT — PAIN SCALES - GENERAL: PAINLEVEL_OUTOF10: 8

## 2024-10-07 NOTE — DISCHARGE INSTRUCTIONS
You were seen emergency department for your typical left-sided cluster headache.  Your vital signs were stable.  No fever noted.  Your headache has been improving since being here.  You politely declined receiving any medication here.    Continue to take your home migraine medications as prescribed.  Please follow-up with neurology if you would like to.  Please follow-up with your primary care provider in 1 week given recent ER visit.    Please return to the Emergency Department if your headache gets worse or changes or if you have any other concerns.

## 2024-10-07 NOTE — ED NOTES
Pt presents to the ED via triage with c/o migraine  Pt admits migraine beginning this morning when he woke up for work  Pt admits hx of \"cluster headaches\"  Pt rates pain at todays visit 8/10  Pt denies sensitivity to light or sound  Denies nausea  Pt admits getting over a cold in which he believes may have triggered the migraine  Pt denies any other complaints  VSS. Resident at bedside to evaluate pt

## 2024-10-07 NOTE — ED PROVIDER NOTES
[facility] ED  Emergency Department Encounter  Emergency Medicine Resident     Pt Name:Darryn Royal Jr.  MRN: [id number]  Birthdate 1990  Date of evaluation: 10/7/24  PCP:  Elizabeth, Pcp  Note Started: 6:30 AM EDT      CHIEF COMPLAINT       Chief Complaint   Patient presents with    Headache       HISTORY OF PRESENT ILLNESS  (Location/Symptom, Timing/Onset, Context/Setting, Quality, Duration, Modifying Factors, Severity.)      Darryn Royal Jr. is a 34 y.o. male with history of cluster headaches who presents after having a cluster headache this morning.  Patient states that his headache woke him from sleep right before his alarm around 5 this morning.  He states his headache is normally behind his left eye and is associated with tearing and runny nose from the left nostril.  He did try some of his nasal spray as well as drank some Mountain Dew's he states that this often helps.  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  He told his employer who sent him home and patient came here to be evaluated so that he could also get a work note for today.  Patient states that his headache is already starting to improve.  He does not want any medications here in the emergency department as he states he gets nervous about taking medications as sometimes they do make his headaches worse.  He denies any fevers or chills.  No upper respiratory symptoms.  No numbness or weakness in his arms or legs.  This headache was typical for him.  No photophobia or phonophobia.  No neck pain.  Patient does not follow with neurology.  He has not been taking the Imitrex that he has been prescribed.    PAST MEDICAL / SURGICAL / SOCIAL / FAMILY HISTORY      has a past medical history of Hydrocele.     has a past surgical history that includes Anterior cruciate ligament repair (Left, 2008) and Hydrocele surgery (Right, 09/28/2016).      Social History     Socioeconomic History    Marital

## 2024-10-07 NOTE — ED PROVIDER NOTES
[facility]     Emergency Department     Faculty Attestation    I performed a history and physical examination of the patient and discussed management with the resident. I have reviewed and agree with the resident’s findings including all diagnostic interpretations, and treatment plans as written at the time of my review. Any areas of disagreement are noted on the chart. I was personally present for the key portions of any procedures. I have documented in the chart those procedures where I was not present during the key portions. For Physician Assistant/ Nurse Practitioner cases/documentation I have personally evaluated this patient and have completed at least one if not all key elements of the E/M (history, physical exam, and MDM). Additional findings are as noted.    PtName: Darryn Royal Jr.  MRN: [id number]  Birthdate 1990  Date of evaluation: 10/7/24  Note Started: 6:32 AM EDT    Primary Care Physician: Elizabeth, Pcp        History: This is a 34 y.o. male who presents to the Emergency Department with complaint of headache.  Patient has history of migraine headache.  This is not the worst headache of his life and was not a sudden onset of headache.  He states he had to leave work early because of the headache and is actually now improving.  He is requesting a work note.    Physical:   oral temperature is 98.4 °F (36.9 °C). His blood pressure is 121/92 (abnormal) and his pulse is 77. His respiration is 16 and oxygen saturation is 98%.  Patient is awake alert.  No facial asymmetry.  Moves all extremities well.    Impression: Cephalgia    Plan: Patient is currently declining any treatment at this time.  Will write a work note for the patient.    Medical Decision Making  Problems Addressed:  Episodic cluster headache, not intractable: acute illness or injury            (Please note that portions of this note were completed with a voice recognition program.

## 2024-10-09 ENCOUNTER — APPOINTMENT (OUTPATIENT)
Dept: CT IMAGING | Age: 34
End: 2024-10-09

## 2024-10-09 ENCOUNTER — HOSPITAL ENCOUNTER (EMERGENCY)
Age: 34
Discharge: HOME | End: 2024-10-09
Attending: EMERGENCY MEDICINE

## 2024-10-09 VITALS
TEMPERATURE: 97.9 F | RESPIRATION RATE: 18 BRPM | BODY MASS INDEX: 19.94 KG/M2 | HEART RATE: 89 BPM | SYSTOLIC BLOOD PRESSURE: 151 MMHG | DIASTOLIC BLOOD PRESSURE: 90 MMHG | WEIGHT: 147 LBS | OXYGEN SATURATION: 99 %

## 2024-10-09 DIAGNOSIS — R51.9 NONINTRACTABLE HEADACHE, UNSPECIFIED CHRONICITY PATTERN, UNSPECIFIED HEADACHE TYPE: Primary | ICD-10-CM

## 2024-10-09 PROCEDURE — 70450 CT HEAD/BRAIN W/O DYE: CPT

## 2024-10-09 PROCEDURE — 99284 EMERGENCY DEPT VISIT MOD MDM: CPT

## 2024-10-09 RX ORDER — KETOROLAC TROMETHAMINE 30 MG/ML
30 INJECTION, SOLUTION INTRAMUSCULAR; INTRAVENOUS ONCE
Status: DISCONTINUED | OUTPATIENT
Start: 2024-10-09 | End: 2024-10-09 | Stop reason: HOSPADM

## 2024-10-09 ASSESSMENT — ENCOUNTER SYMPTOMS
DIARRHEA: 0
COUGH: 0
NAUSEA: 0
BACK PAIN: 0
VOMITING: 0
WHEEZING: 0
ORTHOPNEA: 0
SHORTNESS OF BREATH: 0
ABDOMINAL PAIN: 0

## 2024-10-09 ASSESSMENT — PAIN - FUNCTIONAL ASSESSMENT: PAIN_FUNCTIONAL_ASSESSMENT: 0-10

## 2024-10-09 ASSESSMENT — PAIN SCALES - GENERAL: PAINLEVEL_OUTOF10: 9

## 2024-10-09 NOTE — ED NOTES
Pt to ED room 27 via triage with c/o headache that started around 0245 this morning. Pt states he has history of migraines, does not see neurologist. Pt states his headache typically goes away after a few hours but this one continues to linger. Pt denies and vision changes or sensitivity to light, nausea or emesis. Vitals obtained. Pt denies any chest pain or shortness of breath.     Patient A&Ox4, respirations even and unlabored, and speaking in complete sentences. Call light within reach.

## 2024-10-09 NOTE — ED PROVIDER NOTES
[facility] ED  EMERGENCY DEPARTMENT ENCOUNTER      Pt Name: Darryn Royal Jr.  MRN: [id number]  Birthdate 1990  Date of evaluation: 10/9/24  PCP:  Elizabeth, Pcp    CHIEF COMPLAINT:   Chief Complaint   Patient presents with    Headache     HISTORY OF PRESENT ILLNESS   Darryn Royal Jr. is a 34 y.o. male whopresents with with headache.  Patient has chronic daily headaches he describes a left-sided retro-orbital headaches associate with nausea and vomiting.  He has a history of migraines this feels like his typical headache no falls or trauma no anticoagulation use.  He does not have insurance and he has not had imaging was had but he is concerned due to ongoing headaches.  There is no numbness tingling or neurological deficits.  No double vision.        REVIEW OF SYSTEMS       Review of Systems   Constitutional:  Negative for chills and fever.   HENT:  Negative for nosebleeds.    Respiratory:  Negative for cough, shortness of breath and wheezing.    Cardiovascular:  Negative for chest pain, palpitations and orthopnea.   Gastrointestinal:  Negative for abdominal pain, diarrhea, nausea and vomiting.   Genitourinary:  Negative for dysuria and urgency.   Musculoskeletal:  Negative for back pain and neck pain.   Skin:  Negative for rash.   Neurological:  Positive for headaches. Negative for dizziness and loss of consciousness.       10 essential systems negative except as stated above and in the HPI.      PAST MEDICAL HISTORY   PMH:  has a past medical history of Hydrocele.  SurgicalHistory:  has a past surgical history that includes Anterior cruciate ligament repair (Left, 2008) and Hydrocele surgery (Right, 09/28/2016).  Social History:  reports that he quit smoking about 8 years ago. His smoking use included cigars. He does not have any smokeless tobacco history on file. He reports current alcohol use. He reports that he does not use drugs.  Family History: Noncontributory at this time  Psychiatric    1. No acute intracranial abnormality.               CONSULTS:  None    MDM:    The patient presents with headache without signs of CNS bleed, stroke, infection, temporal arteritis, idiopathic intracranial hypertension, or other serious etiology. The patient is neurologically intact. Given the extremely low risk of these diagnoses further testing and evaluation for these possibilities does not appear to be indicated at this time. The patient appears stable for discharge and has been instructed to return immediately if the symptoms worsen in any way, or in 8-12 hr if not improved for re-evaluation.  We also discussed returning to the Emergency Department immediately if new or worsening symptoms occur. We have discussed the symptoms which are most concerning (e.g., changing or worsening pain, visual or hearing changes, numbness or weakness, fever, stiff neck, or rash) that necessitate immediate return.     The patient understands that at this time there is no evidence for a more malignant underlying process, but the patient also understands that early in the process of an illness or injury, an emergency department workup can be falsely reassuring.  Routine discharge counseling was given, and the patient understands that worsening, changing or persistent symptoms should prompt an immediate call or follow up with their primary physician or return to the emergency department. The importance of appropriate follow up was also discussed.  I have reviewed the disposition diagnosis with the patient and or their family/guardian.  I have answered their questions and given discharge instructions.  They voiced understanding of these instructions and did not have any further questions or complaints.             FINAL IMPRESSION:     1. Nonintractable headache, unspecified chronicity pattern, unspecified headache type          DISPOSITION:  DISPOSITION Decision To Discharge 10/09/2024 09:32:36 AM  Condition at Disposition: Data

## 2024-10-17 ENCOUNTER — HOSPITAL ENCOUNTER (EMERGENCY)
Age: 34
Discharge: HOME | End: 2024-10-17
Attending: EMERGENCY MEDICINE

## 2024-10-17 VITALS
OXYGEN SATURATION: 98 % | HEART RATE: 80 BPM | TEMPERATURE: 98.1 F | RESPIRATION RATE: 17 BRPM | SYSTOLIC BLOOD PRESSURE: 143 MMHG | DIASTOLIC BLOOD PRESSURE: 91 MMHG

## 2024-10-17 DIAGNOSIS — J34.89 NASAL DRYNESS: Primary | ICD-10-CM

## 2024-10-17 PROCEDURE — 99282 EMERGENCY DEPT VISIT SF MDM: CPT

## 2024-10-17 RX ORDER — DEXAMETHASONE SODIUM PHOSPHATE 10 MG/ML
10 INJECTION, SOLUTION INTRAMUSCULAR; INTRAVENOUS ONCE
Status: DISCONTINUED | OUTPATIENT
Start: 2024-10-17 | End: 2024-10-17 | Stop reason: HOSPADM

## 2024-10-17 NOTE — ED PROVIDER NOTES
[facility] ED  Emergency Department Encounter  Emergency Medicine Resident     Pt Name:Darryn Royal Jr.  MRN: [id number]  Birthdate 1990  Date of evaluation: 10/17/24  PCP:  No, Pcp  Note Started: 7:39 AM EDT      CHIEF COMPLAINT       Chief Complaint   Patient presents with    Epistaxis     PTA    Nasal Congestion       HISTORY OF PRESENT ILLNESS  (Location/Symptom, Timing/Onset, Context/Setting, Quality, Duration, Modifying Factors, Severity.)      Darryn Royal Jr. is a 34 y.o. male who presents with burning in the left nostril with associated mild bleeding.  Patient states that this started earlier this morning and he was concerned due to the bleeding.  He does have a history of seasonal allergies.  States he is using a daily nasal spray, its over-the-counter, unsure what is called.  States he wiped his nose this morning and noted to have a small streak of blood.  No continued bleeding, however this is what prompted his concern and brought him to the emergency department.  He does not have a headache at this time.     PAST MEDICAL / SURGICAL / SOCIAL / FAMILY HISTORY      has a past medical history of Hydrocele.       has a past surgical history that includes Anterior cruciate ligament repair (Left, ) and Hydrocele surgery (Right, 2016).      Social History     Socioeconomic History    Marital status: Single     Spouse name: Not on file    Number of children: Not on file    Years of education: Not on file    Highest education level: Not on file   Occupational History    Not on file   Tobacco Use    Smoking status: Former     Current packs/day: 0.00     Types: Cigars, Cigarettes     Quit date: 2016     Years since quittin.3    Smokeless tobacco: Not on file   Substance and Sexual Activity    Alcohol use: Yes     Comment: 1 DRINK EVERY OTHER MONTH    Drug use: No    Sexual activity: Yes     Partners: Female   Other Topics Concern    Not on file   Social History  headaches.       PHYSICAL EXAM      INITIAL VITALS:   BP (!) 143/91   Pulse 80   Temp 98.1 °F (36.7 °C) (Oral)   Resp 17   SpO2 98%     Physical Exam  Constitutional:       Appearance: Normal appearance.   HENT:      Head: Normocephalic and atraumatic.      Nose: Congestion present.      Comments: Mild nasal congestion bilaterally in the nares.  There is no active oozing or bleeding from the nose either nare.  There is clear rhinorrhea.     Mouth/Throat:      Mouth: Mucous membranes are moist.   Eyes:      Conjunctiva/sclera: Conjunctivae normal.      Pupils: Pupils are equal, round, and reactive to light.   Pulmonary:      Effort: Pulmonary effort is normal. No respiratory distress.   Musculoskeletal:      Cervical back: Normal range of motion.   Skin:     General: Skin is warm and dry.   Neurological:      Mental Status: He is alert and oriented to person, place, and time.   Psychiatric:         Mood and Affect: Mood normal.         Behavior: Behavior normal.         Judgment: Judgment normal.           DDX/DIAGNOSTIC RESULTS / EMERGENCY DEPARTMENT COURSE / MDM     Medical Decision Making  34-year-old male who presents with nasal congestion and epistaxis.  On arrival, he is slightly hypertensive, otherwise vital signs are stable.  He has mild nasal congestion bilaterally with clear rhinorrhea.  There is no active oozing or bleeding from the naris bilaterally.  Patient states he has had improvement in symptoms similar to this previously with his history of cluster headaches with IM Decadron.  Will order this and discharged the patient.  Provide the patient with neurology follow-up and PCP follow-up.  Discussed follow-up and return precautions with the patient.  Patient verbalized understand all questions answered.      EMERGENCY DEPARTMENT COURSE:  Discussed follow up and return precautions with the patient. Patient verbalized understanding and all questions were answered.

## 2024-10-17 NOTE — ED PROVIDER NOTES
[facility]  Emergency Department  Faculty Attestation     I performed a history and physical examination of the patient and discussed management with the resident. I reviewed the resident’s note and agree with the documented findings and plan of care. Any areas of disagreement are noted on the chart. I was personally present for the key portions of any procedures. I have documented in the chart those procedures where I was not present during the key portions. I have reviewed the emergency nurses triage note. I agree with the chief complaint, past medical history, past surgical history, allergies, medications, social and family history as documented unless otherwise noted below.    For Physician Assistant/ Nurse Practitioner cases/documentation I have personally evaluated this patient and have completed at least one if not all key elements of the E/M (history, physical exam, and MDM). Additional findings are as noted.    Preliminary note started at 8:21 AM EDT    Primary Care Physician:  Elizabeth, Pcp    Screenings:  [unfilled]    CHIEF COMPLAINT       Chief Complaint   Patient presents with    Epistaxis     PTA    Nasal Congestion       RECENT VITALS:   BP (!) 143/91   Pulse 80   Temp 98.1 °F (36.7 °C) (Oral)   Resp 17   SpO2 98%     LABS:  Labs Reviewed - No data to display    Radiology  No orders to display         Attending Physician Additional  Notes    Patient has a history of cluster headaches, not getting his headaches recently but getting a burning sensation in the left side of his nose that turns into an electric type of tingling over his left orbit.  Occasionally this goes into the left retroauricular region to the left occiput.  No fevers.  Sputum.  Nasal congestion or sinusitis type symptoms.  When he blew his nose this morning had a transient nosebleed which resolved on that same left side.  He did not fill his Imitrex tablet prescription.  He cannot afford the

## 2024-10-17 NOTE — ED NOTES
Pt to ED c/o nasal dryness. Pt states he had blood in the tissue when blowing his nose. Pt states it burns to breath in up in his sinuses. Pt states he has tried nasal spray. No distress noted. Pt alert and oriented x4, talking in complete sentences, respirations even and unlabored. Pt acting age appropriate. Will continue to plan of care.

## 2024-10-17 NOTE — DISCHARGE INSTRUCTIONS
You are seen the ER today for the burning and urinalysis as well as the bleeding.  This is likely related to nasal dryness.  Please put Aquaphor in the nare to help with dryness.  Please follow-up with your primary care provider to be reassessed in the next 3 to 5 days.  Please return to the ER for any new or worsening concerns.      PLEASE RETURN TO THE EMERGENCY DEPARTMENT IMMEDIATELY if you develop any concerning symptoms such as: chest pain, shortness of breath, feeling like your heart is racing, high fever not relieved by acetaminophen (Tylenol) and/or ibuprofen (Motrin / Advil), chills, persistent nausea and/or vomiting, loss of consciousness, numbness, weakness or tingling in the arms or legs or change in color of the extremities, changes in mental status, persistent or severe headache, blurry vision, loss of bladder / bowel control, unable to follow up with your physician, or other any other care or concern.

## 2024-10-29 ENCOUNTER — HOSPITAL ENCOUNTER (EMERGENCY)
Age: 34
Discharge: HOME | End: 2024-10-29
Attending: EMERGENCY MEDICINE

## 2024-10-29 VITALS
DIASTOLIC BLOOD PRESSURE: 82 MMHG | TEMPERATURE: 97.3 F | HEART RATE: 89 BPM | RESPIRATION RATE: 16 BRPM | OXYGEN SATURATION: 100 % | SYSTOLIC BLOOD PRESSURE: 132 MMHG

## 2024-10-29 DIAGNOSIS — R10.13 EPIGASTRIC PAIN: Primary | ICD-10-CM

## 2024-10-29 LAB
ALBUMIN SERPL-MCNC: 4.3 G/DL (ref 3.5–5.2)
ALBUMIN/GLOB SERPL: 2 {RATIO} (ref 1–2.5)
ALP SERPL-CCNC: 86 U/L (ref 40–129)
ALT SERPL-CCNC: 23 U/L (ref 10–50)
ANION GAP SERPL CALCULATED.3IONS-SCNC: 7 MMOL/L (ref 9–16)
AST SERPL-CCNC: 27 U/L (ref 10–50)
BASOPHILS # BLD: 0.04 K/UL (ref 0–0.2)
BASOPHILS NFR BLD: 1 % (ref 0–2)
BILIRUB SERPL-MCNC: 0.6 MG/DL (ref 0–1.2)
BUN SERPL-MCNC: 11 MG/DL (ref 6–20)
CALCIUM SERPL-MCNC: 9.5 MG/DL (ref 8.6–10.4)
CHLORIDE SERPL-SCNC: 106 MMOL/L (ref 98–107)
CO2 SERPL-SCNC: 25 MMOL/L (ref 20–31)
CREAT SERPL-MCNC: 1.1 MG/DL (ref 0.7–1.2)
EOSINOPHIL # BLD: 0.07 K/UL (ref 0–0.44)
EOSINOPHILS RELATIVE PERCENT: 1 % (ref 1–4)
ERYTHROCYTE [DISTWIDTH] IN BLOOD BY AUTOMATED COUNT: 12.6 % (ref 11.8–14.4)
GFR, ESTIMATED: >90 ML/MIN/1.73M2
GLUCOSE SERPL-MCNC: 87 MG/DL (ref 74–99)
HCT VFR BLD AUTO: 39.8 % (ref 40.7–50.3)
HGB BLD-MCNC: 13.2 G/DL (ref 13–17)
IMM GRANULOCYTES # BLD AUTO: 0.03 K/UL (ref 0–0.3)
IMM GRANULOCYTES NFR BLD: 0 %
LIPASE SERPL-CCNC: 19 U/L (ref 13–60)
LYMPHOCYTES NFR BLD: 2.37 K/UL (ref 1.1–3.7)
LYMPHOCYTES RELATIVE PERCENT: 31 % (ref 24–43)
MCH RBC QN AUTO: 29.8 PG (ref 25.2–33.5)
MCHC RBC AUTO-ENTMCNC: 33.2 G/DL (ref 28.4–34.8)
MCV RBC AUTO: 89.8 FL (ref 82.6–102.9)
MONOCYTES NFR BLD: 0.38 K/UL (ref 0.1–1.2)
MONOCYTES NFR BLD: 5 % (ref 3–12)
NEUTROPHILS NFR BLD: 62 % (ref 36–65)
NEUTS SEG NFR BLD: 4.73 K/UL (ref 1.5–8.1)
NRBC BLD-RTO: 0 PER 100 WBC
PLATELET # BLD AUTO: 164 K/UL (ref 138–453)
PMV BLD AUTO: 12 FL (ref 8.1–13.5)
POTASSIUM SERPL-SCNC: 4.4 MMOL/L (ref 3.7–5.3)
PROT SERPL-MCNC: 6.8 G/DL (ref 6.6–8.7)
RBC # BLD AUTO: 4.43 M/UL (ref 4.21–5.77)
SODIUM SERPL-SCNC: 138 MMOL/L (ref 136–145)
WBC OTHER # BLD: 7.6 K/UL (ref 3.5–11.3)

## 2024-10-29 PROCEDURE — 83690 ASSAY OF LIPASE: CPT

## 2024-10-29 PROCEDURE — 80053 COMPREHEN METABOLIC PANEL: CPT

## 2024-10-29 PROCEDURE — 99283 EMERGENCY DEPT VISIT LOW MDM: CPT | Performed by: EMERGENCY MEDICINE

## 2024-10-29 PROCEDURE — 6370000000 HC RX 637 (ALT 250 FOR IP)

## 2024-10-29 PROCEDURE — 85025 COMPLETE CBC W/AUTO DIFF WBC: CPT

## 2024-10-29 RX ORDER — PANTOPRAZOLE SODIUM 40 MG/1
40 TABLET, DELAYED RELEASE ORAL ONCE
Status: COMPLETED | OUTPATIENT
Start: 2024-10-29 | End: 2024-10-29

## 2024-10-29 RX ORDER — MAGNESIUM HYDROXIDE/ALUMINUM HYDROXICE/SIMETHICONE 120; 1200; 1200 MG/30ML; MG/30ML; MG/30ML
30 SUSPENSION ORAL ONCE
Status: COMPLETED | OUTPATIENT
Start: 2024-10-29 | End: 2024-10-29

## 2024-10-29 RX ADMIN — ALUMINUM HYDROXIDE, MAGNESIUM HYDROXIDE, AND SIMETHICONE 30 ML: 200; 200; 20 SUSPENSION ORAL at 07:48

## 2024-10-29 RX ADMIN — PANTOPRAZOLE SODIUM 40 MG: 40 TABLET, DELAYED RELEASE ORAL at 07:48

## 2024-10-29 ASSESSMENT — LIFESTYLE VARIABLES
HOW MANY STANDARD DRINKS CONTAINING ALCOHOL DO YOU HAVE ON A TYPICAL DAY: PATIENT DOES NOT DRINK
HOW OFTEN DO YOU HAVE A DRINK CONTAINING ALCOHOL: NEVER

## 2024-10-29 ASSESSMENT — PAIN DESCRIPTION - LOCATION: LOCATION: ABDOMEN

## 2024-10-29 ASSESSMENT — PAIN SCALES - GENERAL: PAINLEVEL_OUTOF10: 6

## 2024-10-29 ASSESSMENT — PAIN - FUNCTIONAL ASSESSMENT: PAIN_FUNCTIONAL_ASSESSMENT: 0-10

## 2024-10-29 NOTE — ED PROVIDER NOTES
[facility] ED  Emergency Department Encounter  Emergency Medicine Resident     Pt Name:Darryn Royal Jr.  MRN: [id number]  Birthdate 1990  Date of evaluation: 10/29/24  PCP:  No, Pcp  Note Started: 7:28 AM EDT      CHIEF COMPLAINT       Chief Complaint   Patient presents with    Abdominal Pain       HISTORY OF PRESENT ILLNESS  (Location/Symptom, Timing/Onset, Context/Setting, Quality, Duration, Modifying Factors, Severity.)      Darryn Royal Jr. is a 34 y.o. male who presents with abdominal pain that feels like gas started yesterday.  Epigastric/right upper quadrant pain that was 8/10 yesterday and has gotten better today.  Patient was told to come in for evaluation by work.  Patient had some nausea yesterday initially which has also improved.  No episodes of vomiting.  No diarrhea.  No blood in the stool.  No urinary symptoms.  Patient denies any fevers, chills, shortness of breath, chest pains.  Pain feels like gas and that he needs to have bowel movement.  Patient has been taking ibuprofen and caffeine with half case of pop on Friday. Believes it may be related to his excesses pop intake due to its sugar content.     PAST MEDICAL / SURGICAL / SOCIAL / FAMILY HISTORY      has a past medical history of Hydrocele.     has a past surgical history that includes Anterior cruciate ligament repair (Left, ) and Hydrocele surgery (Right, 2016).    Social History     Socioeconomic History    Marital status: Single     Spouse name: Not on file    Number of children: Not on file    Years of education: Not on file    Highest education level: Not on file   Occupational History    Not on file   Tobacco Use    Smoking status: Former     Current packs/day: 0.00     Types: Cigars, Cigarettes     Quit date: 2016     Years since quittin.3    Smokeless tobacco: Not on file   Substance and Sexual Activity    Alcohol use: Yes     Comment: 1 DRINK EVERY OTHER MONTH    Drug use: No    Sexual

## 2024-10-29 NOTE — ED PROVIDER NOTES
[facility]     Emergency Department     Faculty Attestation    I performed a history and physical examination of the patient and discussed management with the resident. I reviewed the resident’s note and agree with the documented findings and plan of care. Any areas of disagreement are noted on the chart. I was personally present for the key portions of any procedures. I have documented in the chart those procedures where I was not present during the key portions. I have reviewed the emergency nurses triage note. I agree with the chief complaint, past medical history, past surgical history, allergies, medications, social and family history as documented unless otherwise noted below.   For Physician Assistant/ Nurse Practitioner cases/documentation I have personally evaluated this patient and have completed at least one if not all key elements of the E/M (history, physical exam, and MDM). Additional findings are as noted.      Primary Care Physician:  No, Pcp    CHIEF COMPLAINT       Chief Complaint   Patient presents with    Abdominal Pain       RECENT VITALS:   Temp: 97.3 °F (36.3 °C),  Pulse: 89, Respirations: 16, BP: 132/82    LABS:  Labs Reviewed   COMPREHENSIVE METABOLIC PANEL   CBC WITH AUTO DIFFERENTIAL   LIPASE         PERTINENT ATTENDING PHYSICIAN COMMENTS:    Patient here with abdominal pain midepigastric right upper quadrant has been drinking a lot of caffeinated soda to help with his cluster headaches also taking quite a bit of Motrin he thinks may be related to that he has slight tenderness but no peritoneal signs it is all midepigastric and right upper quadrant    Critical Care          Bennie Joshua MD,  MD, FAAEM  Attending Emergency  Physician              Bennie Joshua MD  10/29/24 0776

## 2024-10-29 NOTE — ED TRIAGE NOTES
Pt presented to ED 1 via triage  Pt presents with C/O abdominal pain starting yesterday afternoon.  Pt states he thinks the pain is from \"the migraine medicine\". Pt states he gets daily migraines and yesterday afternoon before the pain started he took migraine medicine. Pt states he \"may have taken too much\".  Pt denies CP, SOB, any N/V/D.  Pt is Alert and oriented. Pt is resting comfortably on stretcher with call light in reach.  No acute distress noted. Respirations are even and unlabored.  White board updated. Will continue to follow plan of care.      Will need extra ostomy supplies to go home with       Reason for Admission:  Perforated bowel ([address] [K63.1]                 RUR Score:    12            Plan for utilizing home health:    yes                      Likelihood of Readmission:   LOW                         Transition of Care Plan:              Initial assessment completed with patient. Cognitive status of patient: oriented to time, place, person and situation. Face sheet information confirmed:  yes. The patient designates sonYoni to participate in her discharge plan and to receive any needed information. This patient lives in a single family home with patient. Patient is able to navigate steps as needed. Prior to hospitalization, patient was considered to be independent with ADLs/IADLS : yes . Patient has a current ACP document on file: no      Healthcare Decision Maker:     Click here to complete [address] including selection of the Healthcare Decision Maker Relationship (ie \"Primary\")    The friend and son will be available to transport patient home upon discharge. The patient already has none reported, and  medical equipment available in the home. Patient is not currently active with home health. Patient has not stayed in a skilled nursing facility or rehab. Was  stay within last 60 days : no. This patient is on dialysis :no        List of available Home Health agencies were provided and reviewed with the patient prior to discharge. Freedom of choice signed: yes, for [facility]. Currently, the discharge plan is Home with [facility]. The patient states that she can obtain her medications from the pharmacy, and take her medications as directed. Patient's current insurance is [employer]       Care Management Interventions  PCP Verified by CM:  Yes  Mode of Transport at Discharge: Self  Transition of Care Consult (CM Consult): Discharge Planning, [address]: Other Family Member(s), Child(yoni)  Confirm Follow Up Transport: Self  The Plan for Transition of Care is Related to the Following Treatment Goals : home health  The Patient and/or Patient Representative was Provided with a Choice of Provider and Agrees with the Discharge Plan?: Yes  Freedom of Choice List was Provided with Basic Dialogue that Supports the Patient's Individualized Plan of Care/Goals, Treatment Preferences and Shares the Quality Data Associated with the Providers?: Yes  Discharge Location  Patient Expects to be Discharged to[de-identified] Home with home health

## 2024-10-29 NOTE — ED NOTES
Labeled blood specimens given to runaristeo for labs d/t tube being down.    [x] Green/yellow  [x] Lavender   [] on ice   [x] Blue   [x] Green/black [] on ice  [] Yellow  [] on ice  [] Red  [] Pink  [] Blood Cultures  [] x1 [] X2    [] Ped Green  [] on ice  [] Ped Lavender  [] on ice    [] Ped Yellow  [] on ice  [] Ped Red

## 2024-10-29 NOTE — DISCHARGE INSTRUCTIONS
You were seen in the emergency room for abdominal pain and feeling gassy.  A abdominal workup with blood work showed no abnormalities at this time.  You are being discharged to follow-up with your primary care or establish care with [facility] for further evaluation and workup.  Please return to the emergency room if you experience significant nausea or vomiting worsening abdominal pain or any new symptoms of concern.

## 2024-11-01 ENCOUNTER — HOSPITAL ENCOUNTER (EMERGENCY)
Age: 34
Discharge: HOME | End: 2024-11-01
Attending: STUDENT IN AN ORGANIZED HEALTH CARE EDUCATION/TRAINING PROGRAM

## 2024-11-01 VITALS
SYSTOLIC BLOOD PRESSURE: 121 MMHG | WEIGHT: 144 LBS | HEIGHT: 72 IN | TEMPERATURE: 97.6 F | OXYGEN SATURATION: 99 % | BODY MASS INDEX: 19.5 KG/M2 | RESPIRATION RATE: 18 BRPM | HEART RATE: 77 BPM | DIASTOLIC BLOOD PRESSURE: 75 MMHG

## 2024-11-01 DIAGNOSIS — R51.9 ACUTE NONINTRACTABLE HEADACHE, UNSPECIFIED HEADACHE TYPE: Primary | ICD-10-CM

## 2024-11-01 PROCEDURE — 96375 TX/PRO/DX INJ NEW DRUG ADDON: CPT

## 2024-11-01 PROCEDURE — 96374 THER/PROPH/DIAG INJ IV PUSH: CPT

## 2024-11-01 PROCEDURE — 6370000000 HC RX 637 (ALT 250 FOR IP)

## 2024-11-01 PROCEDURE — 99284 EMERGENCY DEPT VISIT MOD MDM: CPT

## 2024-11-01 PROCEDURE — 6360000002 HC RX W HCPCS

## 2024-11-01 RX ORDER — DIPHENHYDRAMINE HYDROCHLORIDE 50 MG/ML
25 INJECTION INTRAMUSCULAR; INTRAVENOUS ONCE
Status: DISCONTINUED | OUTPATIENT
Start: 2024-11-01 | End: 2024-11-01

## 2024-11-01 RX ORDER — PROCHLORPERAZINE EDISYLATE 5 MG/ML
10 INJECTION INTRAMUSCULAR; INTRAVENOUS ONCE
Status: DISCONTINUED | OUTPATIENT
Start: 2024-11-01 | End: 2024-11-01

## 2024-11-01 RX ORDER — KETOROLAC TROMETHAMINE 15 MG/ML
30 INJECTION, SOLUTION INTRAMUSCULAR; INTRAVENOUS ONCE
Status: DISCONTINUED | OUTPATIENT
Start: 2024-11-01 | End: 2024-11-01

## 2024-11-01 RX ORDER — ACETAMINOPHEN 500 MG
1000 TABLET ORAL ONCE
Status: COMPLETED | OUTPATIENT
Start: 2024-11-01 | End: 2024-11-01

## 2024-11-01 RX ORDER — PROCHLORPERAZINE EDISYLATE 5 MG/ML
10 INJECTION INTRAMUSCULAR; INTRAVENOUS ONCE
Status: COMPLETED | OUTPATIENT
Start: 2024-11-01 | End: 2024-11-01

## 2024-11-01 RX ORDER — KETOROLAC TROMETHAMINE 15 MG/ML
30 INJECTION, SOLUTION INTRAMUSCULAR; INTRAVENOUS ONCE
Status: COMPLETED | OUTPATIENT
Start: 2024-11-01 | End: 2024-11-01

## 2024-11-01 RX ORDER — DIPHENHYDRAMINE HYDROCHLORIDE 50 MG/ML
25 INJECTION INTRAMUSCULAR; INTRAVENOUS ONCE
Status: COMPLETED | OUTPATIENT
Start: 2024-11-01 | End: 2024-11-01

## 2024-11-01 RX ADMIN — ACETAMINOPHEN 1000 MG: 500 TABLET ORAL at 10:57

## 2024-11-01 RX ADMIN — KETOROLAC TROMETHAMINE 30 MG: 15 INJECTION, SOLUTION INTRAMUSCULAR; INTRAVENOUS at 10:58

## 2024-11-01 RX ADMIN — PROCHLORPERAZINE EDISYLATE 10 MG: 5 INJECTION INTRAMUSCULAR; INTRAVENOUS at 10:57

## 2024-11-01 RX ADMIN — DIPHENHYDRAMINE HYDROCHLORIDE 25 MG: 50 INJECTION INTRAMUSCULAR; INTRAVENOUS at 10:58

## 2024-11-01 ASSESSMENT — PAIN DESCRIPTION - LOCATION: LOCATION: HEAD

## 2024-11-01 ASSESSMENT — ENCOUNTER SYMPTOMS
BACK PAIN: 0
VOMITING: 0
COUGH: 0
NAUSEA: 0
DIARRHEA: 0
ABDOMINAL PAIN: 0
SHORTNESS OF BREATH: 0

## 2024-11-01 ASSESSMENT — PAIN SCALES - GENERAL
PAINLEVEL_OUTOF10: 6
PAINLEVEL_OUTOF10: 5

## 2024-11-01 ASSESSMENT — PAIN DESCRIPTION - DESCRIPTORS: DESCRIPTORS: ACHING

## 2024-11-01 ASSESSMENT — PAIN - FUNCTIONAL ASSESSMENT: PAIN_FUNCTIONAL_ASSESSMENT: 0-10

## 2024-11-01 NOTE — ED PROVIDER NOTES
[facility]     Emergency Department     Faculty Attestation    I performed a history and physical examination of the patient and discussed management with the resident. I have reviewed and agree with the resident’s findings including all diagnostic interpretations, and treatment plans as written at the time of my review. Any areas of disagreement are noted on the chart. I was personally present for the key portions of any procedures. I have documented in the chart those procedures where I was not present during the key portions. For Physician Assistant/ Nurse Practitioner cases/documentation I have personally evaluated this patient and have completed at least one if not all key elements of the E/M (history, physical exam, and MDM). Additional findings are as noted.    PtName: Darryn Royal Jr.  MRN: [id number]  Birthdate 1990  Date of evaluation: 11/1/24  Note Started: 10:26 AM EDT    Primary Care Physician: Carlos Alberto Johnson    Brief HPI:  34-year-old male presents emergency department with headache.  He reports a history of cluster headaches.  He reports that pain in his left periorbital/left temple region.  Consistent with his previous headaches.  Denies other neurologic symptoms.  He reports his headache is improving at this time    Pertinent Physical Exam Findings:  Vitals:    11/01/24 1024   BP: 121/75   Pulse: 77   Resp: 18   Temp: 97.6 °F (36.4 °C)   SpO2: 99%   Neurologic exam is grossly unremarkable.  Well-appearing, resting comfortably, alert and interactive.    Medical Decision Making: Patient is a 34 y.o. male presenting to the emergency department with chronic sciatic headaches. The chart was reviewed for pertinent history relating to the chief complaint.  Headache history is consistent with cluster headaches.  His neurologic exam is grossly normal and his headache is improving.  We will treat his symptoms and reevaluate.    All results, including labs (if 
limited to...    # Headache  -CVA, cluster headache, migraine, aneurysm, subarachnoid hemorrhage, intracranial bleed    PLAN: Symptomatic treatment, serial reassessment    REASSESSMENT / RESPONSE TO PLAN / FINDINGS: In brief, patient with headache similar to previous without focal logical deficit presenting to the ED due to headache during work.  Low suspicion for intercranial bleed, patient doubts trauma, doubt intracranial pathology with recently normal scan.  High suspicion for cluster headache based on similar presentation to previous.     *Additional specifics as per ED course when pertinent.    IMPRESSION: Likely cluster headache versus nonspecific headache    DISPO: Patient to be discharged with referral to neurology and appropriate return precautions    Risk  OTC drugs.  Prescription drug management.        EKG    All EKG's are interpreted by the Emergency Department Physician who either signs or Co-signs this chart in the absence of a cardiologist.    EMERGENCY DEPARTMENT COURSE:    ED Course as of 11/01/24 1545   Fri Nov 01, 2024   1322 Patient reevaluated endorsing improvement in symptoms amenable to discharge with close outpatient follow-up. [SINDY]      ED Course User Index  [SINDY] Kentrell Ponce DO       PROCEDURES:    CONSULTS:  None    CRITICAL CARE:  There was significant risk of life threatening deterioration of patient's condition requiring my direct management. Critical care time 20 minutes, excluding any documented procedures.    FINAL IMPRESSION      1. Acute nonintractable headache, unspecified headache type          DISPOSITION / PLAN     DISPOSITION Decision To Discharge 11/01/2024 01:04:35 PM           PATIENT REFERRED TO:  [facility] ED  [address] [address]  [phone]    If symptoms worsen    Olivia Campos MD  [address] [id number] [phone]      Call to make appt for follow-up    [facility] AT [facility]  [address]

## 2024-11-01 NOTE — DISCHARGE INSTRUCTIONS
You have been evaluated in the Emergency Department at [facility] 11/1/2024     Your recommendations and if necessary prescriptions from today's visit:   -Follow-up with neruology  -Take medication as prescribed  -Follow-up with your PCP    If you do not have a primary care provider, establish care with a provider that you can begin to regularly follow-up with.    Should you have any questions regarding your care or further treatment, please call [facility] Emergency Department at [phone].    PLEASE RETURN TO THE EMERGENCY DEPARTMENT IMMEDIATELY for   - Numbness, tingling, weakness    OR    -Changing symptoms  -Worsening symptoms  -Unable to follow up with your primary care provider  -Any other care or concern       Follow-Up Clinic List  *as of October 2024    Consider calling multiple locations and getting an appointment and asking if there is a cancellation list and make sure to cancel appointments you don't need within 24 to 48hrs if you get an appointment at another location!    Family Practice    Specialty/Office Date Phone Number Location   [facility] Mon - Fri [phone] 270[address] [facility] [address]   [facility] Mon - Fri [phone] [facility] [address]   [facility] Mon, Tue, Thur, Fri, Wed [phone] [facility] 2[address] [facility] Mon, Tue, Thur, Fri, 2nd and 4th Sat [phone] 2020 [facility] Mon - Fri [phone] 723 [facility] [address]   [facility] Family Medicine Mon - Fri [phone] 2755 [facility] Walk-in Family Medicine Sun - Sat [phone] 2735 [facility]. [address]     Low Cost Primary Care Providers and General Medical Care    Specialty/Office Phone Number Location   [facility]   [phone]    [email or web address] [address] East Entrance  [address] [place] [address]. [address]   [facility] for the  7294    [email or web address] [email or web address] [facility]  [place], [place] - [address]   [facility] [phone]    [email or web address]   [address]  [facility] [address]   [facility] Dental [facility] [phone]    [email or web address] [facility] [address]   [facility] [phone]    [email or web address] [address] [place] [address]     Specialty Clinic    Specialty/Office Date Phone Number Location   Burn Tuesday -[phone] [facility] Main Building [address]   Craniofacial Tuesday AM   (Every 3 months) [phone] [facility] Main Building [address]   Ear, Nose and Throat Twice Monthly -[phone] [facility] Main Building [address]   Gastroenterology Wednesday -[phone] [facility] Building [address]   General Surgery Thursday -[phone] [facility] Main Building [address]   Podiatry Mon, Wed, and Fri -[phone] [facility] Main Building [address]   Trauma Surgery Tuesday -[phone] [facility] Main Building [address]   Urology Thursday & Friday -[phone] [facility] Building [address]     [facility]    Specialty/Office Date Phone Number Location   Adult Internal Medicine Mon - Fri [phone] [facility] [address]   OB/GYN Mon - Thur Fri Am Only [phone] [facility] [address]   Pediatrics Mon - Fri [phone] [facility] [address]     Gastroenterology    Specialty/Office Date Phone Number Location   [facility] Mon - Fri [phone] [address]; [address]     [facility]    Specialty/Office Date Phone Number Location   Adult

## 2024-11-01 NOTE — ED NOTES
Pt to ED for migraine x1 day. Pt states he gets cluster headaches for which he takes sumatriptan. Pt states he did not take sumatriptan today because he ran out of it. Rates pain 5/10. Patient alert and oriented x4, talking in complete sentences. Respirations even and unlabored. Call light in reach, all needs met at this time

## 2024-11-06 ENCOUNTER — HOSPITAL ENCOUNTER (EMERGENCY)
Age: 34
Discharge: HOME | End: 2024-11-06
Attending: EMERGENCY MEDICINE

## 2024-11-06 VITALS
HEART RATE: 87 BPM | SYSTOLIC BLOOD PRESSURE: 126 MMHG | OXYGEN SATURATION: 99 % | TEMPERATURE: 98.2 F | DIASTOLIC BLOOD PRESSURE: 86 MMHG | RESPIRATION RATE: 20 BRPM

## 2024-11-06 DIAGNOSIS — G44.019 EPISODIC CLUSTER HEADACHE, NOT INTRACTABLE: Primary | ICD-10-CM

## 2024-11-06 PROCEDURE — 99283 EMERGENCY DEPT VISIT LOW MDM: CPT

## 2024-11-06 PROCEDURE — 6360000002 HC RX W HCPCS: Performed by: EMERGENCY MEDICINE

## 2024-11-06 RX ORDER — ACETAMINOPHEN 500 MG
1000 TABLET ORAL ONCE
Status: DISCONTINUED | OUTPATIENT
Start: 2024-11-06 | End: 2024-11-06

## 2024-11-06 RX ORDER — DIPHENHYDRAMINE HYDROCHLORIDE 50 MG/ML
25 INJECTION INTRAMUSCULAR; INTRAVENOUS ONCE
Status: DISCONTINUED | OUTPATIENT
Start: 2024-11-06 | End: 2024-11-06

## 2024-11-06 RX ORDER — DEXAMETHASONE SODIUM PHOSPHATE 10 MG/ML
10 INJECTION, SOLUTION INTRAMUSCULAR; INTRAVENOUS ONCE
Status: DISCONTINUED | OUTPATIENT
Start: 2024-11-06 | End: 2024-11-06

## 2024-11-06 RX ORDER — DEXAMETHASONE 4 MG/1
12 TABLET ORAL ONCE
Status: COMPLETED | OUTPATIENT
Start: 2024-11-06 | End: 2024-11-06

## 2024-11-06 RX ORDER — KETOROLAC TROMETHAMINE 15 MG/ML
15 INJECTION, SOLUTION INTRAMUSCULAR; INTRAVENOUS ONCE
Status: DISCONTINUED | OUTPATIENT
Start: 2024-11-06 | End: 2024-11-06

## 2024-11-06 RX ORDER — PROCHLORPERAZINE EDISYLATE 5 MG/ML
10 INJECTION INTRAMUSCULAR; INTRAVENOUS ONCE
Status: DISCONTINUED | OUTPATIENT
Start: 2024-11-06 | End: 2024-11-06

## 2024-11-06 RX ADMIN — DEXAMETHASONE 12 MG: 4 TABLET ORAL at 14:48

## 2024-11-06 ASSESSMENT — PAIN - FUNCTIONAL ASSESSMENT: PAIN_FUNCTIONAL_ASSESSMENT: NONE - DENIES PAIN

## 2024-11-06 ASSESSMENT — ENCOUNTER SYMPTOMS
SHORTNESS OF BREATH: 0
VOMITING: 0
ABDOMINAL PAIN: 0

## 2024-11-06 NOTE — ED PROVIDER NOTES
[facility] ED     Emergency Department     Faculty Attestation    I performed a history and physical examination of the patient and discussed management with the resident. I reviewed the resident’s note and agree with the documented findings and plan of care. Any areas of disagreement are noted on the chart. I was personally present for the key portions of any procedures. I have documented in the chart those procedures where I was not present during the key portions. I have reviewed the emergency nurses triage note. I agree with the chief complaint, past medical history, past surgical history, allergies, medications, social and family history as documented unless otherwise noted below. For Physician Assistant/ Nurse Practitioner cases/documentation I have personally evaluated this patient and have completed at least one if not all key elements of the E/M (history, physical exam, and MDM). Additional findings are as noted.    2:37 PM EST    Patient with history of cluster headaches presents with his typical headache.  He says it is always located behind his left eye.  He says he was at work when this happened today.  He says he took an ibuprofen once it started and his symptoms are now relieved.  He says that his work told him he should come in to get checked out.  He says he did have some tearing to the left eye and drainage from the left nostril when he had the headache but that has since resolved.  Patient denies any recent fevers or illness.  On exam, patient is resting comfortably in the bed and appears well.  He is alert and oriented and answering questions appropriately.  The bilateral eyes appear normal without any injection.  Neck is supple and nontender.  There are no focal neurologic deficits.  Will treat patient's headache and plan to discharge.      Shabnam Estrada MD  Attending Emergency  Physician

## 2024-11-06 NOTE — ED PROVIDER NOTES
FACULTY SIGN-OUT  ADDENDUM     Care of this patient was assumed from previous attending physician. The patient's initial evaluation and plan have been discussed with the prior provider who initially evaluated the patient.      Note Started: 2:57 PM EST    Attestation  I was available and discussed any additional care issues that arose and coordinated the management plans with the resident(s) caring for the patient during my duty period. Any areas of disagreement with resident's documentation of care or procedures are noted on the chart. I was personally present for the key portions of any/all procedures, during my duty period. I have documented in the chart those procedures where I was not present during the key portions.       ED COURSE      The patient was given the following medications:  Orders Placed This Encounter   Medications    DISCONTD: prochlorperazine (COMPAZINE) injection 10 mg    DISCONTD: ketorolac (TORADOL) injection 15 mg    DISCONTD: diphenhydrAMINE (BENADRYL) injection 25 mg    DISCONTD: dexAMETHasone (PF) (DECADRON) injection 10 mg    DISCONTD: acetaminophen (TYLENOL) tablet 1,000 mg    dexAMETHasone (DECADRON) tablet 12 mg       RECENT VITALS:   Temp: 98.2 °F (36.8 °C), Pulse: 87, Respirations: 20, BP: 126/86    MEDICAL DECISION MAKING        Darryn Royal Jr. is a 34 y.o. male who presents to the Emergency Department with complaints of typical symptoms of a cluster headache.  Will be given a migraine cocktail and reassess.    Patient feeling better with significant proved with headache will be discharged at this time      Hernan Giles MD, MD, F.A.C.E.P.  Attending Emergency Physician    (Please note that portions of this note were completed with a voice recognition program.  Efforts were made to edit the dictations but occasionally words are mis-transcribed.)         Hernan Giles MD  11/06/24 3999

## 2024-11-06 NOTE — DISCHARGE INSTRUCTIONS
You were seen here for a headache.  Your symptoms are consistent with what is called a cluster headache.  Information in the back of this handout on cluster headaches was provided to you.  You received a steroid called Decadron in the emergency department to help with your headaches.    You need to call and schedule follow-up appointment with your primary care provider as well as with a neurologist for soon as possible.    Return to the emergency department immediately if you experience worsening symptoms, develop any other symptoms, or if you have any other concerns.

## 2024-11-06 NOTE — ED PROVIDER NOTES
MD Lore     REVIEW OF SYSTEMS       Review of Systems   Constitutional:  Negative for fever.   Respiratory:  Negative for shortness of breath.    Cardiovascular:  Negative for chest pain.   Gastrointestinal:  Negative for abdominal pain and vomiting.   Neurological:  Positive for headaches.       PHYSICAL EXAM      INITIAL VITALS:   /86   Pulse 87   Temp 98.2 °F (36.8 °C) (Oral)   Resp 20   SpO2 99%     Physical Exam  HENT:      Head: Normocephalic and atraumatic.      Nose: Nose normal.      Mouth/Throat:      Mouth: Mucous membranes are moist.   Eyes:      Extraocular Movements: Extraocular movements intact.      Conjunctiva/sclera: Conjunctivae normal.      Pupils: Pupils are equal, round, and reactive to light.   Cardiovascular:      Rate and Rhythm: Normal rate and regular rhythm.      Pulses: Normal pulses.   Pulmonary:      Effort: Pulmonary effort is normal.      Breath sounds: Normal breath sounds.   Abdominal:      General: There is no distension.      Palpations: Abdomen is soft.      Tenderness: There is no abdominal tenderness. There is no guarding or rebound.   Skin:     General: Skin is warm.   Neurological:      Mental Status: He is alert and oriented to person, place, and time.   Psychiatric:         Mood and Affect: Mood normal.       DDX/DIAGNOSTIC RESULTS / EMERGENCY DEPARTMENT COURSE / MDM     Medical Decision Making  34-year-old male, presents to the emergency department due to left-sided headache.  Patient seen with headache started this morning.  Patient states that he was at work operating [employer] when the headache started.  Patient states he works for [employer].  Patient denies any trauma.  Patient states that whenever he gets a headache it usually is behind the left eye and associated with eye tearing and rhinorrhea out of the left nostril.  Patient states that normally applying a frozen water bottle to the left side of his temporal region helps with his headache.   Patient states he did take 600 mg ibuprofen at 10 AM with some improvement in his symptoms.  Given that he was still having headache while at work, his manager told him to go to the emergency department to be evaluated therefore patient came here.  Patient denies any vision changes, fevers, vomiting.    On evaluation, patient is well-appearing, nontoxic, afebrile.  Lung sounds are clear and equal bilaterally, abdomen soft nontender.  Evaluations of pupils are equal and reactive to light.  Conjunctiva normal, extraocular movements intact without pain.  Differentials include migraine, cluster headache.  Discussed with patient that we will give Tylenol, Toradol, Compazine, Benadryl, and Decadron for his symptoms.  Patient agreeable with this plan.    Risk  OTC drugs.  Prescription drug management.      EMERGENCY DEPARTMENT COURSE:    ED Course as of 11/06/24 1510   Wed Nov 06, 2024   1444 Upon RN discussing medications with patient, he decided he no longer wants the medications we originally discussed and does not want an IV.  Patient  stated to the RN that his headache has improved however he would like the steroid to prevent refractory headaches.  Will give 12 mg of Decadron orally. [SD]   1501 Reassessed patient, discussed that he needs to call and schedule follow-up appointment with a neurologist as well as with a primary care provider for soon as possible.  Return precautions were discussed with patient.  Patient medically stable for discharge. [SD]      ED Course User Index  [SD] Irlanda Galicia MD       PROCEDURES:  None    CONSULTS:  None    FINAL IMPRESSION      1. Episodic cluster headache, not intractable          DISPOSITION / PLAN     DISPOSITION Decision To Discharge 11/06/2024 03:01:37 PM           PATIENT REFERRED TO:  [facility] AT [address] [id number] [phone]  Schedule an appointment as soon as possible for a visit       Olivia Campos MD  [address]

## 2024-11-11 ENCOUNTER — TELEPHONE (OUTPATIENT)
Dept: NEUROLOGY | Age: 34
End: 2024-11-11

## 2024-11-11 NOTE — TELEPHONE ENCOUNTER
11 11 2024 called the patient times 2 (11 04 2024 left voicemail message asking the patient to call the office to schedule an appointment and on 11 04 2024 the voicemail was full at ) at  to schedule an appointment with one of our providers, ER referral, no response.  I mailed the patient a letter asking them to call the office back to schedule this appointment.  KS

## 2025-08-23 ENCOUNTER — HOSPITAL ENCOUNTER (EMERGENCY)
Age: 35
Discharge: HOME | End: 2025-08-23
Attending: EMERGENCY MEDICINE

## 2025-08-23 ENCOUNTER — APPOINTMENT (OUTPATIENT)
Dept: GENERAL RADIOLOGY | Age: 35
End: 2025-08-23

## 2025-08-23 VITALS
SYSTOLIC BLOOD PRESSURE: 130 MMHG | TEMPERATURE: 97.7 F | WEIGHT: 147 LBS | RESPIRATION RATE: 18 BRPM | OXYGEN SATURATION: 100 % | BODY MASS INDEX: 19.94 KG/M2 | DIASTOLIC BLOOD PRESSURE: 107 MMHG | HEART RATE: 76 BPM

## 2025-08-23 DIAGNOSIS — R55 SYNCOPE AND COLLAPSE: Primary | ICD-10-CM

## 2025-08-23 LAB
ANION GAP SERPL CALCULATED.3IONS-SCNC: 10 MMOL/L (ref 9–16)
BASOPHILS # BLD: 0.05 K/UL (ref 0–0.2)
BASOPHILS NFR BLD: 1 % (ref 0–2)
BUN SERPL-MCNC: 11 MG/DL (ref 6–20)
CALCIUM SERPL-MCNC: 9.4 MG/DL (ref 8.6–10.4)
CHLORIDE SERPL-SCNC: 107 MMOL/L (ref 98–107)
CO2 SERPL-SCNC: 24 MMOL/L (ref 20–31)
CREAT SERPL-MCNC: 1.2 MG/DL (ref 0.7–1.2)
EOSINOPHIL # BLD: 0.04 K/UL (ref 0–0.44)
EOSINOPHILS RELATIVE PERCENT: 1 % (ref 1–4)
ERYTHROCYTE [DISTWIDTH] IN BLOOD BY AUTOMATED COUNT: 13.8 % (ref 11.8–14.4)
GFR, ESTIMATED: 81 ML/MIN/1.73M2
GLUCOSE SERPL-MCNC: 102 MG/DL (ref 74–99)
HCT VFR BLD AUTO: 39.2 % (ref 40.7–50.3)
HGB BLD-MCNC: 12.7 G/DL (ref 13–17)
IMM GRANULOCYTES # BLD AUTO: <0.03 K/UL (ref 0–0.3)
IMM GRANULOCYTES NFR BLD: 0 %
LYMPHOCYTES NFR BLD: 3.84 K/UL (ref 1.1–3.7)
LYMPHOCYTES RELATIVE PERCENT: 45 % (ref 24–43)
MAGNESIUM SERPL-MCNC: 2.2 MG/DL (ref 1.6–2.6)
MCH RBC QN AUTO: 29.6 PG (ref 25.2–33.5)
MCHC RBC AUTO-ENTMCNC: 32.4 G/DL (ref 28.4–34.8)
MCV RBC AUTO: 91.4 FL (ref 82.6–102.9)
MONOCYTES NFR BLD: 0.39 K/UL (ref 0.1–1.2)
MONOCYTES NFR BLD: 5 % (ref 3–12)
NEUTROPHILS NFR BLD: 48 % (ref 36–65)
NEUTS SEG NFR BLD: 4.3 K/UL (ref 1.5–8.1)
NRBC BLD-RTO: 0 PER 100 WBC
PLATELET # BLD AUTO: 176 K/UL (ref 138–453)
PMV BLD AUTO: 11.5 FL (ref 8.1–13.5)
POTASSIUM SERPL-SCNC: 4 MMOL/L (ref 3.7–5.3)
RBC # BLD AUTO: 4.29 M/UL (ref 4.21–5.77)
SODIUM SERPL-SCNC: 141 MMOL/L (ref 136–145)
TROPONIN I SERPL HS-MCNC: <6 NG/L (ref 0–22)
TROPONIN I SERPL HS-MCNC: <6 NG/L (ref 0–22)
WBC OTHER # BLD: 8.6 K/UL (ref 3.5–11.3)

## 2025-08-23 PROCEDURE — 84484 ASSAY OF TROPONIN QUANT: CPT

## 2025-08-23 PROCEDURE — 80048 BASIC METABOLIC PNL TOTAL CA: CPT

## 2025-08-23 PROCEDURE — 71046 X-RAY EXAM CHEST 2 VIEWS: CPT

## 2025-08-23 PROCEDURE — 83735 ASSAY OF MAGNESIUM: CPT

## 2025-08-23 PROCEDURE — 85025 COMPLETE CBC W/AUTO DIFF WBC: CPT

## 2025-08-23 PROCEDURE — 99285 EMERGENCY DEPT VISIT HI MDM: CPT

## 2025-08-23 PROCEDURE — 93005 ELECTROCARDIOGRAM TRACING: CPT

## 2025-08-23 ASSESSMENT — PAIN - FUNCTIONAL ASSESSMENT
PAIN_FUNCTIONAL_ASSESSMENT: 0-10
PAIN_FUNCTIONAL_ASSESSMENT: 0-10

## 2025-08-23 ASSESSMENT — PAIN SCALES - GENERAL
PAINLEVEL_OUTOF10: 2
PAINLEVEL_OUTOF10: 0

## 2025-08-24 LAB
EKG ATRIAL RATE: 53 BPM
EKG P AXIS: 72 DEGREES
EKG P-R INTERVAL: 144 MS
EKG Q-T INTERVAL: 404 MS
EKG QRS DURATION: 86 MS
EKG QTC CALCULATION (BAZETT): 379 MS
EKG R AXIS: 89 DEGREES
EKG T AXIS: 74 DEGREES
EKG VENTRICULAR RATE: 53 BPM

## 2025-08-24 PROCEDURE — 93010 ELECTROCARDIOGRAM REPORT: CPT | Performed by: INTERNAL MEDICINE

## 2025-08-26 ENCOUNTER — APPOINTMENT (OUTPATIENT)
Dept: CT IMAGING | Age: 35
End: 2025-08-26

## 2025-08-26 ENCOUNTER — HOSPITAL ENCOUNTER (EMERGENCY)
Age: 35
Discharge: HOME | End: 2025-08-26
Attending: EMERGENCY MEDICINE

## 2025-08-26 VITALS
RESPIRATION RATE: 14 BRPM | TEMPERATURE: 98.4 F | SYSTOLIC BLOOD PRESSURE: 136 MMHG | DIASTOLIC BLOOD PRESSURE: 93 MMHG | HEART RATE: 77 BPM | OXYGEN SATURATION: 100 %

## 2025-08-26 DIAGNOSIS — K64.4 EXTERNAL HEMORRHOID: ICD-10-CM

## 2025-08-26 DIAGNOSIS — K61.0 PERIANAL ABSCESS: Primary | ICD-10-CM

## 2025-08-26 LAB
ANION GAP SERPL CALCULATED.3IONS-SCNC: 10 MMOL/L (ref 9–16)
BASOPHILS # BLD: 0.04 K/UL (ref 0–0.2)
BASOPHILS NFR BLD: 0 % (ref 0–2)
BUN SERPL-MCNC: 11 MG/DL (ref 6–20)
CALCIUM SERPL-MCNC: 9.9 MG/DL (ref 8.6–10.4)
CHLORIDE SERPL-SCNC: 103 MMOL/L (ref 98–107)
CO2 SERPL-SCNC: 27 MMOL/L (ref 20–31)
CREAT SERPL-MCNC: 1.1 MG/DL (ref 0.7–1.2)
EOSINOPHIL # BLD: 0.04 K/UL (ref 0–0.44)
EOSINOPHILS RELATIVE PERCENT: 0 % (ref 1–4)
ERYTHROCYTE [DISTWIDTH] IN BLOOD BY AUTOMATED COUNT: 13.5 % (ref 11.8–14.4)
GFR, ESTIMATED: >90 ML/MIN/1.73M2
GLUCOSE SERPL-MCNC: 91 MG/DL (ref 74–99)
HCT VFR BLD AUTO: 37.9 % (ref 40.7–50.3)
HGB BLD-MCNC: 12.9 G/DL (ref 13–17)
IMM GRANULOCYTES # BLD AUTO: 0.04 K/UL (ref 0–0.3)
IMM GRANULOCYTES NFR BLD: 0 %
LYMPHOCYTES NFR BLD: 2.31 K/UL (ref 1.1–3.7)
LYMPHOCYTES RELATIVE PERCENT: 19 % (ref 24–43)
MCH RBC QN AUTO: 29.9 PG (ref 25.2–33.5)
MCHC RBC AUTO-ENTMCNC: 34 G/DL (ref 28.4–34.8)
MCV RBC AUTO: 87.9 FL (ref 82.6–102.9)
MONOCYTES NFR BLD: 0.68 K/UL (ref 0.1–1.2)
MONOCYTES NFR BLD: 6 % (ref 3–12)
NEUTROPHILS NFR BLD: 75 % (ref 36–65)
NEUTS SEG NFR BLD: 9.28 K/UL (ref 1.5–8.1)
NRBC BLD-RTO: 0 PER 100 WBC
PLATELET # BLD AUTO: ABNORMAL K/UL (ref 138–453)
PLATELET, FLUORESCENCE: ABNORMAL K/UL (ref 138–453)
POTASSIUM SERPL-SCNC: 4 MMOL/L (ref 3.7–5.3)
RBC # BLD AUTO: 4.31 M/UL (ref 4.21–5.77)
SODIUM SERPL-SCNC: 140 MMOL/L (ref 136–145)
WBC OTHER # BLD: 12.4 K/UL (ref 3.5–11.3)

## 2025-08-26 PROCEDURE — 80048 BASIC METABOLIC PNL TOTAL CA: CPT

## 2025-08-26 PROCEDURE — 85025 COMPLETE CBC W/AUTO DIFF WBC: CPT

## 2025-08-26 PROCEDURE — 99285 EMERGENCY DEPT VISIT HI MDM: CPT

## 2025-08-26 PROCEDURE — 85055 RETICULATED PLATELET ASSAY: CPT

## 2025-08-26 PROCEDURE — 99222 1ST HOSP IP/OBS MODERATE 55: CPT | Performed by: SURGERY

## 2025-08-26 PROCEDURE — 74177 CT ABD & PELVIS W/CONTRAST: CPT

## 2025-08-26 PROCEDURE — 6360000004 HC RX CONTRAST MEDICATION

## 2025-08-26 PROCEDURE — 6370000000 HC RX 637 (ALT 250 FOR IP)

## 2025-08-26 PROCEDURE — 10060 I&D ABSCESS SIMPLE/SINGLE: CPT

## 2025-08-26 RX ORDER — DOXYCYCLINE HYCLATE 100 MG
100 TABLET ORAL ONCE
Status: COMPLETED | OUTPATIENT
Start: 2025-08-26 | End: 2025-08-26

## 2025-08-26 RX ORDER — BENZOCAINE/MENTHOL 6 MG-10 MG
LOZENGE MUCOUS MEMBRANE 2 TIMES DAILY
Status: CANCELLED | OUTPATIENT
Start: 2025-08-26

## 2025-08-26 RX ORDER — METRONIDAZOLE 500 MG/1
500 TABLET ORAL ONCE
Status: COMPLETED | OUTPATIENT
Start: 2025-08-26 | End: 2025-08-26

## 2025-08-26 RX ORDER — CIPROFLOXACIN 500 MG/1
500 TABLET, FILM COATED ORAL 2 TIMES DAILY
Qty: 14 TABLET | Refills: 0 | Status: SHIPPED | OUTPATIENT
Start: 2025-08-26 | End: 2025-09-02

## 2025-08-26 RX ORDER — METRONIDAZOLE 500 MG/1
500 TABLET ORAL 3 TIMES DAILY
Qty: 21 TABLET | Refills: 0 | Status: SHIPPED | OUTPATIENT
Start: 2025-08-26 | End: 2025-09-02

## 2025-08-26 RX ORDER — LIDOCAINE HYDROCHLORIDE 10 MG/ML
20 INJECTION, SOLUTION INFILTRATION; PERINEURAL ONCE
Status: DISCONTINUED | OUTPATIENT
Start: 2025-08-26 | End: 2025-08-26 | Stop reason: HOSPADM

## 2025-08-26 RX ORDER — CIPROFLOXACIN 500 MG/1
500 TABLET, FILM COATED ORAL ONCE
Status: COMPLETED | OUTPATIENT
Start: 2025-08-26 | End: 2025-08-26

## 2025-08-26 RX ORDER — IOPAMIDOL 755 MG/ML
75 INJECTION, SOLUTION INTRAVASCULAR
Status: COMPLETED | OUTPATIENT
Start: 2025-08-26 | End: 2025-08-26

## 2025-08-26 RX ORDER — CIPROFLOXACIN 100 MG/1
400 TABLET, FILM COATED ORAL ONCE
Status: DISCONTINUED | OUTPATIENT
Start: 2025-08-26 | End: 2025-08-26

## 2025-08-26 RX ADMIN — CIPROFLOXACIN HYDROCHLORIDE 500 MG: 500 TABLET, FILM COATED ORAL at 18:22

## 2025-08-26 RX ADMIN — DOXYCYCLINE HYCLATE 100 MG: 100 TABLET, COATED ORAL at 15:42

## 2025-08-26 RX ADMIN — METRONIDAZOLE 500 MG: 500 TABLET ORAL at 18:22

## 2025-08-26 RX ADMIN — IOPAMIDOL 75 ML: 755 INJECTION, SOLUTION INTRAVENOUS at 14:21

## 2025-08-26 ASSESSMENT — ENCOUNTER SYMPTOMS
ANAL BLEEDING: 0
EYE DISCHARGE: 0
VOMITING: 0
BLOOD IN STOOL: 0
BACK PAIN: 0
DIARRHEA: 0
CONSTIPATION: 0
EYE PAIN: 0
NAUSEA: 0
ABDOMINAL PAIN: 0
COUGH: 0
SHORTNESS OF BREATH: 0

## 2025-08-31 PROBLEM — K61.0 PERIANAL ABSCESS: Status: ACTIVE | Noted: 2025-08-31
